# Patient Record
Sex: FEMALE | Race: WHITE | NOT HISPANIC OR LATINO | Employment: FULL TIME | URBAN - METROPOLITAN AREA
[De-identification: names, ages, dates, MRNs, and addresses within clinical notes are randomized per-mention and may not be internally consistent; named-entity substitution may affect disease eponyms.]

---

## 2019-03-20 ENCOUNTER — OFFICE VISIT (OUTPATIENT)
Dept: ENDOCRINOLOGY | Age: 29
End: 2019-03-20

## 2019-03-20 VITALS
DIASTOLIC BLOOD PRESSURE: 88 MMHG | HEART RATE: 97 BPM | HEIGHT: 68 IN | SYSTOLIC BLOOD PRESSURE: 120 MMHG | WEIGHT: 227.4 LBS | BODY MASS INDEX: 34.46 KG/M2

## 2019-03-20 DIAGNOSIS — R53.82 CHRONIC FATIGUE: ICD-10-CM

## 2019-03-20 DIAGNOSIS — R63.5 ABNORMAL WEIGHT GAIN: ICD-10-CM

## 2019-03-20 DIAGNOSIS — E04.2 NONTOXIC MULTINODULAR GOITER: Primary | ICD-10-CM

## 2019-03-20 PROCEDURE — 99205 OFFICE O/P NEW HI 60 MIN: CPT | Performed by: INTERNAL MEDICINE

## 2019-03-20 RX ORDER — DROSPIRENONE AND ETHINYL ESTRADIOL 0.03MG-3MG
1 KIT ORAL DAILY
COMMUNITY
End: 2022-06-09

## 2019-03-20 RX ORDER — BUPROPION HYDROCHLORIDE 300 MG/1
300 TABLET ORAL DAILY
COMMUNITY
End: 2020-10-23 | Stop reason: HOSPADM

## 2019-03-20 RX ORDER — FLUTICASONE PROPIONATE 50 MCG
SPRAY, SUSPENSION (ML) NASAL AS NEEDED
Refills: 3 | COMMUNITY
Start: 2019-02-05

## 2019-03-20 NOTE — PROGRESS NOTES
28 y.o.  Patient Care Team:  Provider, No Known as PCP - General    Chief complaint     NEW PATIENT CONSULT, REFERRED BY JENNIFER NOGUEIRA FOR THYROID NODULES.    HPI   Patient is a 28-year-old white female with a history of multinodular goiter came for new patient consultation    Patient reported that approximately 6 months ago she was noted to have enlarged thyroid on routine examination and underwent thyroid ultrasound  Subsequently she had a repeat ultrasound in January 2019 which revealed that 1 of the nodules has grown larger hence the consultation    Patient denies any difficulty swallowing but she thinks in the [employer]arsan[employer] and occasionally finds it difficult to take some high notes  She also reports sinus drainage    Patient reports symptoms of easy fatigability slight tremor abnormal weight gain for the past several months.  She also reports heat and cold intolerance  She is currently on birth control medication    Abnormal weight gain  Patient gained nearly 50 pounds while she was taking Cymbalta  She apparently stopped Cymbalta and is currently on Wellbutrin and lost 10 pounds so far  Fatigue  Patient reports significant fatigue for the past several months  She is a   She sleeps well  Her energy levels have improved since he started Wellbutrin  Patient has strong family history of celiac disease  Her dad had celiac disease  Patient's mother had Graves' disease and underwent radioiodine ablation  Patient was positive for celiac antibodies in the past and has started adhering to a gluten-free diet and feels much better.    Patient denied any prior history of exposure to head and neck radiation.  She also denied any family history of thyroid cancer        Interval History      The following portions of the patient's history were reviewed and updated as appropriate: allergies, current medications, past family history, past medical history, past social history, past surgical history and  "problem list.    Past Medical History:   Diagnosis Date   • Exercise-induced asthma    • Kidney stones        Family History   Problem Relation Age of Onset   • Hyperthyroidism Mother    • Hypertension Mother    • Arthritis Father    • Hypertension Father        Social History     Socioeconomic History   • Marital status: Unknown     Spouse name: Not on file   • Number of children: Not on file   • Years of education: Not on file   • Highest education level: Not on file       Allergies   Allergen Reactions   • Morphine Rash         Current Outpatient Medications:   •  buPROPion XL (WELLBUTRIN XL) 300 MG 24 hr tablet, Take 300 mg by mouth Daily., Disp: , Rfl:   •  drospirenone-ethinyl estradiol (OCELLA) 3-0.03 MG per tablet, Take 1 tablet by mouth Daily., Disp: , Rfl:   •  fluticasone (FLONASE) 50 MCG/ACT nasal spray, , Disp: , Rfl: 3           Review of Systems   Constitutional: Positive for fatigue. Negative for chills and fever.   HENT: Positive for trouble swallowing and voice change. Negative for sore throat.    Eyes: Negative for pain and redness.   Respiratory: Positive for shortness of breath. Negative for wheezing.    Cardiovascular: Negative for chest pain and palpitations.   Gastrointestinal: Positive for abdominal pain, constipation, diarrhea and nausea. Negative for vomiting.   Endocrine: Positive for cold intolerance and heat intolerance.   Genitourinary: Negative for frequency.   Musculoskeletal: Positive for joint swelling.   Skin: Negative for rash and wound.   Neurological: Negative for tremors, light-headedness and headaches.   Hematological: Bruises/bleeds easily.   Psychiatric/Behavioral: Negative for confusion and sleep disturbance. The patient is nervous/anxious.    All other systems reviewed and are negative.        Objective:  /88   Pulse 97   Ht 172.7 cm (68\")   Wt 103 kg (227 lb 6.4 oz)   BMI 34.58 kg/m²     Physical Exam   Constitutional: She is oriented to person, place, and " time. She appears well-developed and well-nourished.   Obese   HENT:   Head: Normocephalic and atraumatic.   Eyes: EOM are normal. Pupils are equal, round, and reactive to light.   Neck: Normal range of motion. Neck supple. Thyromegaly (Nontender) present.   Cardiovascular: Normal rate, regular rhythm, normal heart sounds and intact distal pulses.   Pulmonary/Chest: Effort normal and breath sounds normal.   Abdominal: Soft. Bowel sounds are normal.   Musculoskeletal: Normal range of motion.   Neurological: She is alert and oriented to person, place, and time.   Skin: Skin is warm and dry.   Psychiatric: She has a normal mood and affect. Her behavior is normal.   Nursing note and vitals reviewed.        Results Review:    I reviewed the patient's new clinical results.  No results found for any previous visit.       No images are attached to the encounter.    Giuseppe was seen today for thyroid problem.    Diagnoses and all orders for this visit:    Nontoxic multinodular goiter  -     T3  -     T4, Free  -     TSH  -     Thyroid Peroxidase Antibody  -     Thyroid Stimulating Immunoglobulin  -     US Thyroid; Future    Chronic fatigue  -     T3  -     T4, Free  -     TSH  -     Thyroid Peroxidase Antibody  -     Thyroid Stimulating Immunoglobulin  -     US Thyroid; Future    Abnormal weight gain    Discussed thyroid function and structure in detail with the patient    I discussed American thyroid Association guidelines for evaluation of thyroid nodules  Thyroid imaging from the CD could not be viewed  Thyroid ultrasound report from high-field MRI reviewed  Patient appears to have multiple nodules  She has a 1.2 cm nodule in the left lobe which has some calcification  This ultrasound was from January 2019    Patient denies any difficulty swallowing or change in voice however she reports the frequent throat clearing and is not able to sing in her [employer] comfortably    Patient has strong family history of celiac disease  her dad had celiac disease  Patient's mother had Graves' disease and had radioiodine ablation    Patient's total T4 was elevated a few months ago but was also on birth control medication at that time    I advised the patient to get lab testing done today to define the thyroid function  I advised her to get a thyroid ultrasound and [facility] in June 2019 before her appointment  Patient will return to follow-up in 3 months    The total time spent  was more than 60 min of which greater than 35 min (greater than 50% of the total time) was spent face to face on counseling the patient on recommended evaluation and treatment options, instructions for management/treatment and /or follow up and importance of compliance with chosen management or treatment options

## 2019-03-22 LAB
T3 SERPL-MCNC: 274 NG/DL (ref 80–200)
T4 FREE SERPL-MCNC: 1.13 NG/DL (ref 0.93–1.7)
THYROPEROXIDASE AB SERPL-ACNC: 15 IU/ML (ref 0–34)
TSH SERPL DL<=0.005 MIU/L-ACNC: 0.62 MIU/ML (ref 0.27–4.2)
TSI ACT/NOR SER: <0.1 IU/L (ref 0–0.55)

## 2019-04-22 ENCOUNTER — APPOINTMENT (OUTPATIENT)
Dept: ULTRASOUND IMAGING | Facility: HOSPITAL | Age: 29
End: 2019-04-22

## 2019-05-28 ENCOUNTER — RESULTS ENCOUNTER (OUTPATIENT)
Dept: ENDOCRINOLOGY | Age: 29
End: 2019-05-28

## 2019-05-28 DIAGNOSIS — E04.2 NONTOXIC MULTINODULAR GOITER: ICD-10-CM

## 2019-05-28 DIAGNOSIS — R53.82 CHRONIC FATIGUE: ICD-10-CM

## 2019-05-28 DIAGNOSIS — E04.2 NONTOXIC MULTINODULAR GOITER: Primary | ICD-10-CM

## 2020-10-12 NOTE — PROGRESS NOTES
29 y.o.    Patient Care Team:  Kirstie Maldonado APRN as PCP - General (Emergency Medicine)    Chief Complaint:    FOLLOW UP/ THYROID NODULES  FORMER [facility]ERV[facility] WING  Subjective     HPI  29-year-old white female is here for the follow-up of thyroid nodules and obesity.  Patient used to see Dr. Ozuna in the past.    Today in clinic patient reports that her energy levels are low, she has been napping daily.  She has been in the medical weight loss program and also on phentermine through a weight loss clinic but still has been having difficulty in losing weight.  Does have family history of thyroid disease, maternal side of hypo and hyperthyroidism.    mother has history of thyroid cancer.    She does report of having a lumpy feeling in her neck, occasional feeling of difficulty in swallowing.  Last thyroid ultrasound was in 2019, she had multiple thyroid nodules with a dominant nodule of 1.2 cm in size.    Last menstrual cycle is from 9/30/2020.        Reviewed primary care physician's/consulting physician documentation and lab results       Interval History      The following portions of the patient's history were reviewed and updated as appropriate: allergies, current medications, past family history, past medical history, past social history, past surgical history and problem list.    Past Medical History:   Diagnosis Date   • Exercise-induced asthma    • Kidney stones      Family History   Problem Relation Age of Onset   • Hyperthyroidism Mother    • Hypertension Mother    • Arthritis Father    • Hypertension Father      Social History     Socioeconomic History   • Marital status: Single     Spouse name: Not on file   • Number of children: Not on file   • Years of education: Not on file   • Highest education level: Not on file   Tobacco Use   • Smoking status: Never Smoker   • Smokeless tobacco: Never Used     Allergies   Allergen Reactions   • Morphine Rash       Current Outpatient Medications:  "  •  drospirenone-ethinyl estradiol (OCELLA) 3-0.03 MG per tablet, Take 1 tablet by mouth Daily., Disp: , Rfl:   •  fluticasone (FLONASE) 50 MCG/ACT nasal spray, , Disp: , Rfl: 3  •  desvenlafaxine (PRISTIQ) 100 MG 24 hr tablet, Take 100 mg by mouth Daily., Disp: , Rfl:   •  phentermine (ADIPEX-P) 37.5 MG tablet, Take 37.5 mg by mouth Daily., Disp: , Rfl:   •  Zenatane 40 MG capsule, Take 40 mg by mouth Daily., Disp: , Rfl:         Review of Systems   Constitutional: Negative for appetite change, fatigue and fever.   HENT: Negative for trouble swallowing.    Eyes: Negative for visual disturbance.   Respiratory: Negative for shortness of breath.    Cardiovascular: Negative for palpitations and leg swelling.   Gastrointestinal: Negative for abdominal pain and vomiting.   Endocrine: Negative for polydipsia and polyuria.   Musculoskeletal: Negative for joint swelling and neck pain.   Skin: Negative for rash.   Neurological: Negative for weakness and numbness.   Psychiatric/Behavioral: Negative for behavioral problems.     I have reviewed and confirmed the accuracy of the ROS as documented by the MA/LPN/RN Jeanette Lui MD          Objective       Vitals:    10/23/20 0846   BP: 146/82   Pulse: (!) 124   Resp: 16   SpO2: 99%   Weight: 99.8 kg (220 lb)   Height: 172.7 cm (68\")     Body mass index is 33.45 kg/m².      Physical Exam  Vitals signs reviewed.   Constitutional:       Appearance: Normal appearance. She is obese. She is not diaphoretic.   HENT:      Head: Normocephalic and atraumatic.   Eyes:      General: No scleral icterus.     Conjunctiva/sclera: Conjunctivae normal.   Neck:      Musculoskeletal: Normal range of motion and neck supple.      Thyroid: No thyromegaly.      Comments: Thyromegaly  Cardiovascular:      Rate and Rhythm: Normal rate.   Pulmonary:      Effort: No respiratory distress.   Abdominal:      General: There is no distension.      Palpations: Abdomen is soft.   Musculoskeletal:         " General: No tenderness or deformity.   Skin:     General: Skin is warm and dry.   Neurological:      Mental Status: She is alert and oriented to person, place, and time. Mental status is at baseline.      Gait: Gait normal.   Psychiatric:         Mood and Affect: Mood normal.         Behavior: Behavior normal.         Results Review:     I reviewed the patient's new clinical results and mentioned them above in HPI and in plan as well.    Medical records reviewed  Summary:Done      Office Visit on 03/20/2019   Component Date Value Ref Range Status   • T3, Total 03/20/2019 274.0* 80.0 - 200.0 ng/dl Final   • Free T4 03/20/2019 1.13  0.93 - 1.70 ng/dL Final   • TSH 03/20/2019 0.624  0.270 - 4.200 mIU/mL Final   • Thyroid Peroxidase Antibody 03/20/2019 15  0 - 34 IU/mL Final   • Thyroid Stimulating Immunoglobulin 03/20/2019 <0.10  0.00 - 0.55 IU/L Final     No results found for: HGBA1C  Lab Results   Component Value Date    CREATININE 0.5 (L) 02/12/2020     Imaging Results (Most Recent)     None                Assessment and Plan:    Diagnoses and all orders for this visit:    1. Nontoxic multinodular goiter (Primary)  -     US Thyroid; Future  -     TSH  -     T4, Free  -     T3, Free  -     Thyroid Peroxidase Antibody  -     TSH; Future  -     T4, Free; Future  -     T3, Free; Future  -     Vitamin B12 & Folate; Future  -     Vitamin D 25 Hydroxy; Future    2. Chronic fatigue  -     US Thyroid; Future  -     TSH  -     T4, Free  -     T3, Free  -     Thyroid Peroxidase Antibody  -     TSH; Future  -     T4, Free; Future  -     T3, Free; Future  -     Vitamin B12 & Folate; Future  -     Vitamin D 25 Hydroxy; Future    3. Abnormal weight gain  -     US Thyroid; Future  -     TSH  -     T4, Free  -     T3, Free  -     Thyroid Peroxidase Antibody  -     TSH; Future  -     T4, Free; Future  -     T3, Free; Future  -     Vitamin B12 & Folate; Future  -     Vitamin D 25 Hydroxy; Future      Multiple thyroid nodules  Reviewed  "the prior thyroid ultrasound from an outside hospital.  Proceed with another ultrasound for the year 2020 and also based on her her bowel symptoms.    Chronic fatigue  Check thyroid panel.    Obesity - worse   On phentermine prescribed by the weight loss clinic.  Gave information in  AVS on calorie counting and exercise regimen to help with the weight loss.      Reviewed Lab results with the patient.               Jeanette Lui MD  10/23/20    EMR Dragon / transcription disclaimer:     \"Dictated utilizing Dragon dictation\".      "

## 2020-10-23 ENCOUNTER — OFFICE VISIT (OUTPATIENT)
Dept: ENDOCRINOLOGY | Age: 30
End: 2020-10-23

## 2020-10-23 VITALS
OXYGEN SATURATION: 99 % | HEIGHT: 68 IN | RESPIRATION RATE: 16 BRPM | WEIGHT: 220 LBS | SYSTOLIC BLOOD PRESSURE: 146 MMHG | HEART RATE: 124 BPM | BODY MASS INDEX: 33.34 KG/M2 | DIASTOLIC BLOOD PRESSURE: 82 MMHG

## 2020-10-23 DIAGNOSIS — R63.5 ABNORMAL WEIGHT GAIN: ICD-10-CM

## 2020-10-23 DIAGNOSIS — R53.82 CHRONIC FATIGUE: ICD-10-CM

## 2020-10-23 DIAGNOSIS — E04.2 NONTOXIC MULTINODULAR GOITER: Primary | ICD-10-CM

## 2020-10-23 PROCEDURE — 99214 OFFICE O/P EST MOD 30 MIN: CPT | Performed by: INTERNAL MEDICINE

## 2020-10-23 RX ORDER — PHENTERMINE HYDROCHLORIDE 37.5 MG/1
37.5 TABLET ORAL DAILY
COMMUNITY
Start: 2020-08-11 | End: 2022-06-09

## 2020-10-23 RX ORDER — DESVENLAFAXINE 100 MG/1
100 TABLET, EXTENDED RELEASE ORAL DAILY
COMMUNITY
Start: 2020-10-12

## 2020-10-23 RX ORDER — ISOTRETINOIN 40 MG/1
40 CAPSULE, GELATIN COATED ORAL DAILY
COMMUNITY
Start: 2020-10-05 | End: 2022-06-09

## 2020-10-24 LAB
T3FREE SERPL-MCNC: 4.2 PG/ML (ref 2–4.4)
T4 FREE SERPL-MCNC: 1.29 NG/DL (ref 0.93–1.7)
THYROPEROXIDASE AB SERPL-ACNC: <9 IU/ML (ref 0–34)
TSH SERPL DL<=0.005 MIU/L-ACNC: 0.71 UIU/ML (ref 0.27–4.2)

## 2020-10-28 ENCOUNTER — TELEPHONE (OUTPATIENT)
Dept: ENDOCRINOLOGY | Age: 30
End: 2020-10-28

## 2020-11-05 ENCOUNTER — TELEPHONE (OUTPATIENT)
Dept: ENDOCRINOLOGY | Age: 30
End: 2020-11-05

## 2020-11-05 NOTE — TELEPHONE ENCOUNTER
----- Message from Nuvia Flores sent at 11/4/2020  2:41 PM EST -----  Yes I just went in and authorized it and sent it over as urgent. I will call them so they can call the patient to schedule.  ----- Message -----  From: Nessa Chowdary MA  Sent: 11/4/2020   9:30 AM EST  To: Nuvia Flores      ----- Message -----  From: Shannan Buitrago  Sent: 11/4/2020   9:07 AM EST  To: Nessa Chowdary MA    Pt was seen on 10/23 and was told she needed to have another thyroid ultra sound before the end of this year and has not heard anything. Its been two week and pt is wanting to get this scheduled asap. Can we get this scheduled and give pt a call? Pt call back [phone].

## 2020-11-06 ENCOUNTER — APPOINTMENT (OUTPATIENT)
Dept: OTHER | Facility: HOSPITAL | Age: 30
End: 2020-11-06

## 2020-11-06 ENCOUNTER — HOSPITAL ENCOUNTER (OUTPATIENT)
Dept: ULTRASOUND IMAGING | Facility: HOSPITAL | Age: 30
Discharge: HOME | End: 2020-11-06

## 2020-11-06 DIAGNOSIS — E04.2 NONTOXIC MULTINODULAR GOITER: ICD-10-CM

## 2020-11-06 DIAGNOSIS — Z09 FOLLOW UP: ICD-10-CM

## 2020-11-06 DIAGNOSIS — R63.5 ABNORMAL WEIGHT GAIN: ICD-10-CM

## 2020-11-06 DIAGNOSIS — R53.82 CHRONIC FATIGUE: ICD-10-CM

## 2020-11-06 PROCEDURE — 76536 US EXAM OF HEAD AND NECK: CPT

## 2020-12-08 ENCOUNTER — TELEPHONE (OUTPATIENT)
Dept: ENDOCRINOLOGY | Age: 30
End: 2020-12-08

## 2020-12-08 NOTE — TELEPHONE ENCOUNTER
Patient called in stating she received a letter about having a ultrasound on thyroid for goiter and nogial   She needs to have it scheduled

## 2021-04-07 ENCOUNTER — LAB (OUTPATIENT)
Dept: ENDOCRINOLOGY | Age: 31
End: 2021-04-07

## 2021-04-07 DIAGNOSIS — E04.2 NONTOXIC MULTINODULAR GOITER: ICD-10-CM

## 2021-04-07 DIAGNOSIS — R63.5 ABNORMAL WEIGHT GAIN: ICD-10-CM

## 2021-04-07 DIAGNOSIS — E55.9 VITAMIN D DEFICIENCY: ICD-10-CM

## 2021-04-07 DIAGNOSIS — R53.82 CHRONIC FATIGUE: ICD-10-CM

## 2021-04-07 DIAGNOSIS — E04.2 NONTOXIC MULTINODULAR GOITER: Primary | ICD-10-CM

## 2021-04-08 LAB
25(OH)D3+25(OH)D2 SERPL-MCNC: 63.4 NG/ML (ref 30–100)
ALBUMIN SERPL-MCNC: 4.1 G/DL (ref 3.5–5.2)
ALBUMIN/GLOB SERPL: 1.4 G/DL
ALP SERPL-CCNC: 87 U/L (ref 39–117)
ALT SERPL-CCNC: 14 U/L (ref 1–33)
AST SERPL-CCNC: 14 U/L (ref 1–32)
BILIRUB SERPL-MCNC: <0.2 MG/DL (ref 0–1.2)
BUN SERPL-MCNC: 12 MG/DL (ref 6–20)
BUN/CREAT SERPL: 21.1 (ref 7–25)
CALCIUM SERPL-MCNC: 9.7 MG/DL (ref 8.6–10.5)
CHLORIDE SERPL-SCNC: 105 MMOL/L (ref 98–107)
CHOLEST SERPL-MCNC: 146 MG/DL (ref 0–200)
CO2 SERPL-SCNC: 24.6 MMOL/L (ref 22–29)
CREAT SERPL-MCNC: 0.57 MG/DL (ref 0.57–1)
FOLATE SERPL-MCNC: 2.81 NG/ML (ref 4.78–24.2)
GLOBULIN SER CALC-MCNC: 2.9 GM/DL
GLUCOSE SERPL-MCNC: 86 MG/DL (ref 65–99)
HDLC SERPL-MCNC: 43 MG/DL (ref 40–60)
IMP & REVIEW OF LAB RESULTS: NORMAL
LDLC SERPL CALC-MCNC: 58 MG/DL (ref 0–100)
POTASSIUM SERPL-SCNC: 4.4 MMOL/L (ref 3.5–5.2)
PROT SERPL-MCNC: 7 G/DL (ref 6–8.5)
SODIUM SERPL-SCNC: 139 MMOL/L (ref 136–145)
T3FREE SERPL-MCNC: 3.4 PG/ML (ref 2–4.4)
T4 FREE SERPL-MCNC: 1.12 NG/DL (ref 0.93–1.7)
TRIGL SERPL-MCNC: 290 MG/DL (ref 0–150)
TSH SERPL DL<=0.005 MIU/L-ACNC: 0.43 UIU/ML (ref 0.27–4.2)
VIT B12 SERPL-MCNC: 1104 PG/ML (ref 211–946)
VLDLC SERPL CALC-MCNC: 45 MG/DL (ref 5–40)

## 2021-04-23 ENCOUNTER — OFFICE VISIT (OUTPATIENT)
Dept: ENDOCRINOLOGY | Age: 31
End: 2021-04-23

## 2021-04-23 VITALS
RESPIRATION RATE: 15 BRPM | SYSTOLIC BLOOD PRESSURE: 122 MMHG | WEIGHT: 209 LBS | BODY MASS INDEX: 31.67 KG/M2 | HEIGHT: 68 IN | DIASTOLIC BLOOD PRESSURE: 82 MMHG | OXYGEN SATURATION: 99 % | HEART RATE: 82 BPM

## 2021-04-23 DIAGNOSIS — R63.5 ABNORMAL WEIGHT GAIN: ICD-10-CM

## 2021-04-23 DIAGNOSIS — E04.2 NONTOXIC MULTINODULAR GOITER: Primary | ICD-10-CM

## 2021-04-23 PROCEDURE — 99214 OFFICE O/P EST MOD 30 MIN: CPT | Performed by: INTERNAL MEDICINE

## 2021-04-23 NOTE — PROGRESS NOTES
"Chief Complaint  Thyroid Problem    Subjective            History of Present Illness  Caterina Palacios,30 y.o. is here as a follow-up for the evaluation of thyroid nodules and morbid obesity.      Today in clinic patient reports that her energy levels are still low, but she has been following weight watchers and as a result has lost about 10 to 15 pounds of weight since her last visit.  She has seen someone at weight loss clinic and has been on phentermine but according to the patient she does have the prescription but she is not using the medication.  Does have family history of thyroid cancer in her mother.    Thyroid ultrasound from November 2020 showed dominant nodule of approximately 1.1 cm in size.  Does report a lumpy feeling in her neck.  Never had the biopsy.    Reviewed primary care physician's/consulting physician documentation and lab results     I have reviewed the patient's allergies, medicines, past medical hx, family hx and social hx in detail.    Objective   Vital Signs:   /82 (BP Location: Right arm, Patient Position: Sitting, Cuff Size: Large Adult)   Pulse 82   Resp 15   Ht 172.7 cm (67.99\")   Wt 94.8 kg (209 lb)   SpO2 99%   BMI 31.79 kg/m²     Physical Exam   General appearance - no distress  Eyes- anicteric sclera  Ear nose and throat-external ears and nose normal.    Respiratory-normal chest on inspection.  No respiratory distress noted.  Skin-no rashes.  Neuro-alert and oriented x3          Result Review :   The following data was reviewed by: Jeanette Lui MD on 04/23/2021:  Lab on 04/07/2021   Component Date Value Ref Range Status   • T3, Free 04/07/2021 3.4  2.0 - 4.4 pg/mL Final   • Free T4 04/07/2021 1.12  0.93 - 1.70 ng/dL Final    Results may be falsely increased if patient taking Biotin.   • TSH 04/07/2021 0.435  0.270 - 4.200 uIU/mL Final   • Total Cholesterol 04/07/2021 146  0 - 200 mg/dL Final    Comment: Cholesterol Reference Ranges  (U.S. Department of Health and " Human Services ATP III  Classifications)  Desirable          <200 mg/dL  Borderline High    200-239 mg/dL  High Risk          >240 mg/dL  Triglyceride Reference Ranges  (U.S. Department of Health and Human Services ATP III  Classifications)  Normal           <150 mg/dL  Borderline High  150-199 mg/dL  High             200-499 mg/dL  Very High        >500 mg/dL  HDL Reference Ranges  (U.S. Department of Health and Human Services ATP III  Classifcations)  Low     <40 mg/dl (major risk factor for CHD)  High    >60 mg/dl ('negative' risk factor for CHD)  LDL Reference Ranges  (U.S. Department of Health and Human Services ATP III  Classifcations)  Optimal          <100 mg/dL  Near Optimal     100-129 mg/dL  Borderline High  130-159 mg/dL  High             160-189 mg/dL  Very High        >189 mg/dL     • Triglycerides 04/07/2021 290* 0 - 150 mg/dL Final   • HDL Cholesterol 04/07/2021 43  40 - 60 mg/dL Final   • VLDL Cholesterol Marcio 04/07/2021 45* 5 - 40 mg/dL Final   • LDL Chol Calc ([facility]) 04/07/2021 58  0 - 100 mg/dL Final   • 25 Hydroxy, Vitamin D 04/07/2021 63.4  30.0 - 100.0 ng/ml Final    Comment: Results may be falsely increased if patient taking Biotin.  Reference Range for Total Vitamin D 25(OH)  Deficiency <20.0 ng/mL  Insufficiency 21-29 ng/mL  Sufficiency  ng/mL  Toxicity >100 ng/ml     • Glucose 04/07/2021 86  65 - 99 mg/dL Final   • BUN 04/07/2021 12  6 - 20 mg/dL Final   • Creatinine 04/07/2021 0.57  0.57 - 1.00 mg/dL Final   • eGFR Non African Am 04/07/2021 126  >60 mL/min/1.73 Final    Comment: GFR Normal >60  Chronic Kidney Disease <60  Kidney Failure <15     • eGFR  Am 04/07/2021 >150  >60 mL/min/1.73 Final   • BUN/Creatinine Ratio 04/07/2021 21.1  7.0 - 25.0 Final   • Sodium 04/07/2021 139  136 - 145 mmol/L Final   • Potassium 04/07/2021 4.4  3.5 - 5.2 mmol/L Final   • Chloride 04/07/2021 105  98 - 107 mmol/L Final   • Total CO2 04/07/2021 24.6  22.0 - 29.0 mmol/L Final   • Calcium  04/07/2021 9.7  8.6 - 10.5 mg/dL Final   • Total Protein 04/07/2021 7.0  6.0 - 8.5 g/dL Final   • Albumin 04/07/2021 4.10  3.50 - 5.20 g/dL Final   • Globulin 04/07/2021 2.9  gm/dL Final   • A/G Ratio 04/07/2021 1.4  g/dL Final   • Total Bilirubin 04/07/2021 <0.2  0.0 - 1.2 mg/dL Final   • Alkaline Phosphatase 04/07/2021 87  39 - 117 U/L Final   • AST (SGOT) 04/07/2021 14  1 - 32 U/L Final   • ALT (SGPT) 04/07/2021 14  1 - 33 U/L Final   • Vitamin B-12 04/07/2021 1,104* 211 - 946 pg/mL Final    Results may be falsely increased if patient taking Biotin.   • Folate 04/07/2021 2.81* 4.78 - 24.20 ng/mL Final    Results may be falsely increased if patient taking Biotin.   • Interpretation 04/07/2021 Note   Final    Supplemental report is available.     Data reviewed: Primary CARE documentation        I reviewed the patient's new clinical results and mentioned them above in HPI and in plan as well.          Assessment and Plan    Problem List Items Addressed This Visit        Other    Nontoxic multinodular goiter - Primary    Relevant Orders    Basic Metabolic Panel    Lipid Panel    TSH    T4, Free    US Guided Thyroid Biopsy    Abnormal weight gain    Relevant Orders    Basic Metabolic Panel    Lipid Panel    TSH    T4, Free    US Guided Thyroid Biopsy        Thyroid nodule  Discussed with the patient the procedure of the biopsy.  Put in the orders for interventional radiology.    Reviewed the thyroid ultrasound from November 2020.    Obesity  Discussed about the behavioral changes-calorie restrictions and exercise regimen to help with the weight loss.    Will defer the phentermine prescription to the primary care or the weight loss clinic.    Follow Up   No follow-ups on file.    Refills/Meds sent to pharmacy    Interpreted the blood work-up/imaging results performed by the primary care/consulting physician -    Patient was given instructions and counseling regarding her condition or for health maintenance advice.  Please see specific information pulled into the AVS if appropriate.

## 2021-05-02 RX ORDER — CETIRIZINE HYDROCHLORIDE 10 MG/1
10 TABLET ORAL DAILY
COMMUNITY
End: 2022-06-09

## 2021-05-07 ENCOUNTER — HOSPITAL ENCOUNTER (OUTPATIENT)
Dept: ULTRASOUND IMAGING | Facility: HOSPITAL | Age: 31
Discharge: HOME | End: 2021-05-07
Admitting: INTERNAL MEDICINE

## 2021-05-07 VITALS
HEIGHT: 68 IN | BODY MASS INDEX: 31.37 KG/M2 | HEART RATE: 80 BPM | DIASTOLIC BLOOD PRESSURE: 93 MMHG | SYSTOLIC BLOOD PRESSURE: 135 MMHG | OXYGEN SATURATION: 100 % | RESPIRATION RATE: 16 BRPM | WEIGHT: 207 LBS | TEMPERATURE: 97.3 F

## 2021-05-07 DIAGNOSIS — R63.5 ABNORMAL WEIGHT GAIN: ICD-10-CM

## 2021-05-07 DIAGNOSIS — E04.2 NONTOXIC MULTINODULAR GOITER: ICD-10-CM

## 2021-05-07 PROCEDURE — 88305 TISSUE EXAM BY PATHOLOGIST: CPT | Performed by: INTERNAL MEDICINE

## 2021-05-07 PROCEDURE — 25010000003 LIDOCAINE 1 % SOLUTION: Performed by: RADIOLOGY

## 2021-05-07 PROCEDURE — 88173 CYTOPATH EVAL FNA REPORT: CPT | Performed by: INTERNAL MEDICINE

## 2021-05-07 RX ORDER — ALPRAZOLAM 0.5 MG/1
0.5 TABLET ORAL ONCE
Status: COMPLETED | OUTPATIENT
Start: 2021-05-07 | End: 2021-05-07

## 2021-05-07 RX ORDER — LIDOCAINE HYDROCHLORIDE 10 MG/ML
10 INJECTION, SOLUTION INFILTRATION; PERINEURAL ONCE
Status: COMPLETED | OUTPATIENT
Start: 2021-05-07 | End: 2021-05-07

## 2021-05-07 RX ADMIN — LIDOCAINE HYDROCHLORIDE 6 ML: 10 INJECTION, SOLUTION INFILTRATION; PERINEURAL at 11:18

## 2021-05-07 RX ADMIN — ALPRAZOLAM 0.5 MG: 0.5 TABLET ORAL at 09:17

## 2021-05-08 ENCOUNTER — TELEPHONE (OUTPATIENT)
Dept: RADIOLOGY | Facility: HOSPITAL | Age: 31
End: 2021-05-08

## 2021-05-10 LAB
CYTO UR: NORMAL
CYTOLOGY INITIAL INTERPRETATION: NORMAL
LAB AP CASE REPORT: NORMAL
LAB AP NON-GYN SPECIMEN ADEQUACY: NORMAL
PATH REPORT.FINAL DX SPEC: NORMAL
PATH REPORT.GROSS SPEC: NORMAL

## 2021-05-10 NOTE — PROGRESS NOTES
Please let the patient know that the biopsy was benign.  Means no cancer cells were found which is great news.

## 2022-06-09 ENCOUNTER — OFFICE VISIT (OUTPATIENT)
Dept: ENDOCRINOLOGY | Age: 32
End: 2022-06-09

## 2022-06-09 VITALS
HEART RATE: 75 BPM | WEIGHT: 237.2 LBS | DIASTOLIC BLOOD PRESSURE: 72 MMHG | SYSTOLIC BLOOD PRESSURE: 118 MMHG | HEIGHT: 68 IN | BODY MASS INDEX: 35.95 KG/M2 | TEMPERATURE: 97.3 F | OXYGEN SATURATION: 98 %

## 2022-06-09 DIAGNOSIS — E04.2 NONTOXIC MULTINODULAR GOITER: Primary | ICD-10-CM

## 2022-06-09 PROCEDURE — 99214 OFFICE O/P EST MOD 30 MIN: CPT | Performed by: INTERNAL MEDICINE

## 2022-06-09 NOTE — PROGRESS NOTES
"Chief Complaint  Goiter    Subjective            History of Present Illness  Caterina Palacios,31 y.o. is here as a follow-up for the evaluation of goiter and thyroid issue along with morbid obesity.    Today in clinic patient reports that she is planning to get pregnant.  Has been trying so for about a year now.  Planning to start Clomid in the next few weeks by the OB/GYN.  During last visit thyroid ultrasound was noted with a dominant nodule of 1.1 cm in size, s/p biopsy, with pathology being benign.  She is not on any thyroid replacement based on the blood work-up from April 2021.      Reviewed primary care physician's/consulting physician documentation and lab results     I have reviewed the patient's allergies, medicines, past medical hx, family hx and social hx in detail.    Objective   Vital Signs:   /72   Pulse 75   Temp 97.3 °F (36.3 °C)   Ht 172.7 cm (68\")   Wt 108 kg (237 lb 3.2 oz)   SpO2 98%   BMI 36.07 kg/m²     Physical Exam   General appearance - no distress  Eyes- anicteric sclera  Ear nose and throat-external ears and nose normal.    Respiratory-normal chest on inspection.  No respiratory distress noted.  Skin-no rashes.  Neuro-alert and oriented x3          Result Review :   The following data was reviewed by: Jeanette Lui MD on 06/09/2022:  Hospital Outpatient Visit on 05/07/2021   Component Date Value Ref Range Status   • Case Report 05/07/2021    Final                    Value:Medical Cytology Report                           Case: [id number]                                 Authorizing Provider:  Jeanette Lui MD        Collected:           05/07/2021 10:15 AM          Ordering Location:     [facility]  Received:            05/07/2021 11:46 AM                                 [place]                                                                           Pathologist:           Telma Pérez MD                                                          Specimen:    " Thyroid, Left                                                                             • Final Diagnosis 05/07/2021    Final                    Value:This result contains rich text formatting which cannot be displayed here.   • Cytology Initial Interpretation 05/07/2021    Final                    Value:This result contains rich text formatting which cannot be displayed here.   • Specimen Adequacy 05/07/2021 FNA immediate cytologic evaluation, satisfactory   Final   • Gross Description 05/07/2021    Final                    Value:This result contains rich text formatting which cannot be displayed here.   • Microscopic Description 05/07/2021    Final                    Value:This result contains rich text formatting which cannot be displayed here.     Data reviewed: OB/GYN and ultrasound report        I reviewed the patient's new clinical results and mentioned them above in HPI and in plan as well.          Assessment and Plan    Problem List Items Addressed This Visit        Other    Nontoxic multinodular goiter - Primary    Relevant Orders    TSH    T3, Free    T4, Free    US Thyroid    Vitamin D 25 Hydroxy    Vitamin B12 & Folate    Basic Metabolic Panel        Goiter  Interpreted the ultrasound report from 2020.  Integrated the pathology report and devised the current treatment plan  Proceed with a thyroid ultrasound.    Planning pregnancy  Check folic acid levels has been very low before    Check B12 and vitamin D levels.    will have the TSH levels below 2.5 to help with the fertility.    Follow Up   No follow-ups on file.    Refills/Meds sent to pharmacy    Interpreted the blood work-up/imaging results performed by the primary care/consulting physician -    Patient was given instructions and counseling regarding her condition or for health maintenance advice. Please see specific information pulled into the AVS if appropriate.

## 2022-06-10 LAB
25(OH)D3+25(OH)D2 SERPL-MCNC: 31.2 NG/ML (ref 30–100)
BUN SERPL-MCNC: 11 MG/DL (ref 6–20)
BUN/CREAT SERPL: 19 (ref 9–23)
CALCIUM SERPL-MCNC: 10.4 MG/DL (ref 8.7–10.2)
CHLORIDE SERPL-SCNC: 100 MMOL/L (ref 96–106)
CO2 SERPL-SCNC: 21 MMOL/L (ref 20–29)
CREAT SERPL-MCNC: 0.59 MG/DL (ref 0.57–1)
EGFRCR SERPLBLD CKD-EPI 2021: 125 ML/MIN/1.73
FOLATE SERPL-MCNC: 13.1 NG/ML
GLUCOSE SERPL-MCNC: 79 MG/DL (ref 65–99)
POTASSIUM SERPL-SCNC: 4.5 MMOL/L (ref 3.5–5.2)
SODIUM SERPL-SCNC: 138 MMOL/L (ref 134–144)
T3FREE SERPL-MCNC: 4.6 PG/ML (ref 2–4.4)
T4 FREE SERPL-MCNC: 1.34 NG/DL (ref 0.82–1.77)
TSH SERPL DL<=0.005 MIU/L-ACNC: 0.65 UIU/ML (ref 0.45–4.5)
VIT B12 SERPL-MCNC: 966 PG/ML (ref 232–1245)

## 2022-06-20 ENCOUNTER — HOSPITAL ENCOUNTER (OUTPATIENT)
Dept: ULTRASOUND IMAGING | Facility: HOSPITAL | Age: 32
Discharge: HOME | End: 2022-06-20
Admitting: INTERNAL MEDICINE

## 2022-06-20 DIAGNOSIS — E04.2 NONTOXIC MULTINODULAR GOITER: ICD-10-CM

## 2022-06-20 PROCEDURE — 76536 US EXAM OF HEAD AND NECK: CPT

## 2022-11-08 LAB
EXTERNAL HEPATITIS B SURFACE ANTIGEN: NEGATIVE
EXTERNAL HEPATITIS C AB: NON REACTIVE
EXTERNAL RUBELLA QUALITATIVE: NORMAL
EXTERNAL SYPHILIS RPR SCREEN: NORMAL
HIV1 P24 AG SERPL QL IA: NORMAL

## 2023-05-14 ENCOUNTER — HOSPITAL ENCOUNTER (OUTPATIENT)
Facility: HOSPITAL | Age: 33
Discharge: HOME | End: 2023-05-15
Attending: STUDENT IN AN ORGANIZED HEALTH CARE EDUCATION/TRAINING PROGRAM | Admitting: OBSTETRICS & GYNECOLOGY
Payer: COMMERCIAL

## 2023-05-14 PROBLEM — Z34.90 CURRENTLY PREGNANT: Status: ACTIVE | Noted: 2023-05-14

## 2023-05-14 LAB
BACTERIA UR QL AUTO: ABNORMAL /HPF
BILIRUB UR QL STRIP: NEGATIVE
CLARITY UR: CLEAR
COLOR UR: YELLOW
GLUCOSE UR STRIP-MCNC: NEGATIVE MG/DL
HGB UR QL STRIP.AUTO: ABNORMAL
HYALINE CASTS UR QL AUTO: ABNORMAL /LPF
KETONES UR QL STRIP: ABNORMAL
LEUKOCYTE ESTERASE UR QL STRIP.AUTO: ABNORMAL
NITRITE UR QL STRIP: NEGATIVE
PH UR STRIP.AUTO: 7 [PH] (ref 5–8)
PROT UR QL STRIP: NEGATIVE
RBC # UR STRIP: ABNORMAL /HPF
REF LAB TEST METHOD: ABNORMAL
SP GR UR STRIP: 1.01 (ref 1–1.03)
SQUAMOUS #/AREA URNS HPF: ABNORMAL /HPF
UROBILINOGEN UR QL STRIP: ABNORMAL
WBC # UR STRIP: ABNORMAL /HPF

## 2023-05-14 PROCEDURE — 81001 URINALYSIS AUTO W/SCOPE: CPT | Performed by: OBSTETRICS & GYNECOLOGY

## 2023-05-14 PROCEDURE — G0378 HOSPITAL OBSERVATION PER HR: HCPCS

## 2023-05-14 PROCEDURE — 25010000002 CEFTRIAXONE PER 250 MG: Performed by: OBSTETRICS & GYNECOLOGY

## 2023-05-14 PROCEDURE — 96360 HYDRATION IV INFUSION INIT: CPT

## 2023-05-14 PROCEDURE — 96361 HYDRATE IV INFUSION ADD-ON: CPT

## 2023-05-14 PROCEDURE — G0463 HOSPITAL OUTPT CLINIC VISIT: HCPCS

## 2023-05-14 RX ORDER — SODIUM CHLORIDE, SODIUM LACTATE, POTASSIUM CHLORIDE, CALCIUM CHLORIDE 600; 310; 30; 20 MG/100ML; MG/100ML; MG/100ML; MG/100ML
125 INJECTION, SOLUTION INTRAVENOUS CONTINUOUS
Status: DISCONTINUED | OUTPATIENT
Start: 2023-05-14 | End: 2023-05-15 | Stop reason: HOSPADM

## 2023-05-14 RX ORDER — CYCLOBENZAPRINE HCL 10 MG
10 TABLET ORAL ONCE
Status: COMPLETED | OUTPATIENT
Start: 2023-05-14 | End: 2023-05-14

## 2023-05-14 RX ORDER — ASPIRIN 81 MG/1
81 TABLET, CHEWABLE ORAL DAILY
COMMUNITY

## 2023-05-14 RX ORDER — SODIUM CHLORIDE 0.9 % (FLUSH) 0.9 %
10 SYRINGE (ML) INJECTION AS NEEDED
Status: DISCONTINUED | OUTPATIENT
Start: 2023-05-14 | End: 2023-05-15 | Stop reason: HOSPADM

## 2023-05-14 RX ORDER — ACETAMINOPHEN 500 MG
1000 TABLET ORAL EVERY 6 HOURS PRN
Status: DISCONTINUED | OUTPATIENT
Start: 2023-05-14 | End: 2023-05-15 | Stop reason: HOSPADM

## 2023-05-14 RX ORDER — FERROUS SULFATE 325(65) MG
325 TABLET ORAL
COMMUNITY

## 2023-05-14 RX ORDER — PRENATAL VIT NO.126/IRON/FOLIC 28MG-0.8MG
1 TABLET ORAL DAILY
COMMUNITY

## 2023-05-14 RX ADMIN — SODIUM CHLORIDE, POTASSIUM CHLORIDE, SODIUM LACTATE AND CALCIUM CHLORIDE 125 ML/HR: 600; 310; 30; 20 INJECTION, SOLUTION INTRAVENOUS at 08:50

## 2023-05-14 RX ADMIN — CYCLOBENZAPRINE 10 MG: 10 TABLET, FILM COATED ORAL at 05:52

## 2023-05-14 RX ADMIN — ACETAMINOPHEN 1000 MG: 500 TABLET, FILM COATED ORAL at 18:45

## 2023-05-14 RX ADMIN — SODIUM CHLORIDE, POTASSIUM CHLORIDE, SODIUM LACTATE AND CALCIUM CHLORIDE 125 ML/HR: 600; 310; 30; 20 INJECTION, SOLUTION INTRAVENOUS at 20:15

## 2023-05-14 RX ADMIN — CEFTRIAXONE 2 G: 2 INJECTION, POWDER, FOR SOLUTION INTRAMUSCULAR; INTRAVENOUS at 06:01

## 2023-05-14 RX ADMIN — SODIUM CHLORIDE, POTASSIUM CHLORIDE, SODIUM LACTATE AND CALCIUM CHLORIDE 1000 ML: 600; 310; 30; 20 INJECTION, SOLUTION INTRAVENOUS at 06:40

## 2023-05-14 NOTE — SIGNIFICANT NOTE
Called Dr. Rice at this time regarding patient. Patient  34 weeks 2 days. Patient came in tonight with complaints of back pain 8 out of 10. Not relieved with tylenol. Patient currently rates her pain 3-4. Patient UA showed 1+ ketones, trace blood, trace leukocytes, trace bacteria, and wbc 6-12. Orders given for 1L LR bolus, 10mg po flexeril, 1gm rocephin IV, and recheck in couple of hours.

## 2023-05-14 NOTE — PLAN OF CARE
Goal Outcome Evaluation:  Plan of Care Reviewed With: patient        Progress: improving  Outcome Evaluation: Patient back pain and cramping decreased.

## 2023-05-14 NOTE — H&P
SHARIFA Conway  Obstetric History and Physical     Chief Complaint: Pt presented to triage reporting contractions.     Subjective     Patient is a 32 y.o. female  currently at 34.2 , who presents with contractions.      Prenatal Information:  See office H&P for full details and labs.      Past OB History:       OB History    Para Term  AB Living   1 0 0 0 0 0   SAB IAB Ectopic Molar Multiple Live Births   0 0 0 0 0 0               Past Medical History: Past Medical History:   Diagnosis Date   • Exercise-induced asthma    • Kidney stones     kidney stones        Past Surgical History Past Surgical History:   Procedure Laterality Date   • US GUIDED FINE NEEDLE ASPIRATION  2021        Family History: Family History   Problem Relation Age of Onset   • Hyperthyroidism Mother    • Hypertension Mother    • Arthritis Father    • Hypertension Father       Social History:  reports that she has never smoked. She has never used smokeless tobacco.   reports no history of alcohol use.   reports no history of drug use.        General ROS: Pertinent items are noted in HPI    Objective      Vitals:     Vitals:    23 0850 23 0900 23 0930 23 1000   BP:  112/64 130/83 143/74   BP Location:       Patient Position:       Pulse:  99 96 102   Resp:       Temp: 98.8 °F (37.1 °C)      TempSrc:       SpO2:  100% 100% 100%   Weight:       Height:           Fetal Heart Rate Assessment:   Category 1    [place]:   q 2     Physical Exam:     General Appearance:    Alert, cooperative, in no acute distress   Abdomen:     Soft, non-tender, no guarding, no rebound tenderness,       EFW < 7#   Pelvic Exam:    Pelvis adequate.    Presentation: Vertex    Cervix: changed from closed to FT/50%    Extremities:   Moves all extremities well, no edema, no cyanosis, no              redness   Skin:   No bleeding, bruising or rash   Neurologic:   No focal neurologic defect          Laboratory Results:   Lab Results (last 48  hours)     Procedure Component Value Units Date/Time    Urinalysis, Microscopic Only - Urine, Clean Catch [[id number]]  (Abnormal) Collected: 23    Specimen: Urine, Clean Catch Updated: 23 0503     RBC, UA 0-2 /HPF      WBC, UA 6-12 /HPF      Bacteria, UA Trace /HPF      Squamous Epithelial Cells, UA 3-6 /HPF      Hyaline Casts, UA 0-2 /LPF      Methodology Automated Microscopy    Urinalysis With Culture If Indicated - Urine, Clean Catch [[id number]]  (Abnormal) Collected: 23    Specimen: Urine, Clean Catch Updated: 23     Color, UA Yellow     Appearance, UA Clear     pH, UA 7.0     Specific Gravity, UA 1.009     Glucose, UA Negative     Ketones, UA 15 mg/dL (1+)     Bilirubin, UA Negative     Blood, UA Trace     Protein, UA Negative     Leuk Esterase, UA Trace     Nitrite, UA Negative     Urobilinogen, UA 0.2 E.U./dL    Narrative:      In absence of clinical symptoms, the presence of pyuria, bacteria, and/or nitrites on the urinalysis result does not correlate with infection.             Assessment & Plan     Principal Problem:    Currently pregnant         Assessment:  1.  Intrauterine pregnancy at 34.2 wks gestation.    2.   contractions  3.  UTI     Plan:  1. Will admit for observation and observe for cervical change. Will continue ivfs and Flexeril   2. UTI treated with Rocephin   3.  Risks, benefits of treatment plan have been discussed.  4.  All questions have been answered.         Shannan Rice MD   2023   10:32 EDT

## 2023-05-15 VITALS
DIASTOLIC BLOOD PRESSURE: 81 MMHG | HEART RATE: 106 BPM | SYSTOLIC BLOOD PRESSURE: 131 MMHG | TEMPERATURE: 97.9 F | BODY MASS INDEX: 37.82 KG/M2 | HEIGHT: 68 IN | OXYGEN SATURATION: 100 % | WEIGHT: 249.56 LBS | RESPIRATION RATE: 18 BRPM

## 2023-05-15 LAB
COLLECT DURATION TIME UR: 12 HRS
PROT 24H UR-MRATE: 111 MG/24HOURS (ref 0–150)
SPECIMEN VOL 24H UR: 1500 ML

## 2023-05-15 PROCEDURE — G0378 HOSPITAL OBSERVATION PER HR: HCPCS

## 2023-05-15 PROCEDURE — 81050 URINALYSIS VOLUME MEASURE: CPT | Performed by: OBSTETRICS & GYNECOLOGY

## 2023-05-15 PROCEDURE — 25010000002 CEFTRIAXONE PER 250 MG: Performed by: OBSTETRICS & GYNECOLOGY

## 2023-05-15 PROCEDURE — 84156 ASSAY OF PROTEIN URINE: CPT | Performed by: OBSTETRICS & GYNECOLOGY

## 2023-05-15 RX ADMIN — CEFTRIAXONE 2 G: 2 INJECTION, POWDER, FOR SOLUTION INTRAMUSCULAR; INTRAVENOUS at 06:32

## 2023-05-15 NOTE — NON STRESS TEST
Obstetrical Non-stress Test Interpretation     Name:  Caterina Palacios  MRN: [id number]    32 y.o. female  at 34w3d    Indication: Antepartum      Baseline: Normal 110/160   Variability:   Moderate/Normal (amplitude 6-25 bpm)   Accelerations: Present   Decelerations: Absent   Contractions:  Present       Impression:  Category 1      Helen Ramirez RN  5/15/2023  08:49 EDT

## 2023-05-15 NOTE — NON STRESS TEST
Obstetrical Non-stress Test Interpretation     Name:  Caterina Palacios  MRN: [id number]    32 y.o. female  at 34w2d    Indication: Antepartum       Baseline: Normal 110-160 bpm  Variability:   Moderate/Normal (amplitude 6-25 bpm)  Accelerations: Present (32 weeks+) 15 x 15 bpm  Decelerations: Absent   Contractions:  Present       Impression:  Category I    Verified with second nurse, Abi Jimenez RN  2023  20:24 EDT     1 or 2

## 2023-05-15 NOTE — SIGNIFICANT NOTE
Case Management Discharge Note      Final Note: (P) home         Selected Continued Care - Discharged on 5/15/2023 Admission date: 5/14/2023 - Discharge disposition: Home or Self Care              Transportation Services  Private: Car    Final Discharge Disposition Code: (P) 01 - home or self-care

## 2023-05-15 NOTE — PLAN OF CARE
Goal Outcome Evaluation:  Plan of Care Reviewed With: patient, spouse           Outcome Evaluation: Dr. Diane notified of lab results, nst reactive. OK to discharge to home. Patient scheduled for prenatal visit tomorrow. Discussed  labor warning s/s, SROM, elevated b/p's, warning s/s discussed as well as when to call MD or come to OB. Strongly advised drinking plenty of water, stay hydrated. Pt verbalized understanding. IV d/rafita and pt left per ambulatory to home.

## 2023-05-16 LAB — EXTERNAL GROUP B STREP ANTIGEN: NORMAL

## 2023-05-16 NOTE — DISCHARGE SUMMARY
Date of Discharge:  2023    Presenting Problem/History of Present Illness:  Active Hospital Problems    Diagnosis  POA   • **Currently pregnant [Z34.90]  Not Applicable      Resolved Hospital Problems   No resolved problems to display.       Discharge Diagnosis:   Gestational hypertension  Rule out  labor    Procedures Performed:           Consults:   Consults     No orders found from 4/15/2023 to 5/15/2023.          Hospital Course:  Patient is a 32 y.o. female  currently at 34w3d, presented with  contractions and concern for PTL. Admitted overnight for observation with no cervical change, 0/0/-3 on discharge.   During observation found to have elevated Bps and she now meets criteria for GHTN due to mild range Bps. Had 24 hour urine with normal protein of 111, so no signs of proteinuria. No symptoms of severe preeclampsia. Patient was counseled on strict return precaution sand s/sx of PTL, and also counseled on s/sx of preE. She has a FU apt with me on Tuesday and will plan for delivery planning at that time due to new dx of GHTN.     Pertinent Test Results:   Lab Results (last 72 hours)     Procedure Component Value Units Date/Time    Protein, Urine, 24 Hour - Urine, Clean Catch [[id number]] Collected: 05/15/23 0840    Specimen: 24 Hour Urine from Urine, Clean Catch Updated: 05/15/23 0917     Protein, 24H Urine 111.0 mg/24hours      24H Urine Volume 1,500 mL      Time (Hours) 12 hrs     Narrative:      This is not a 24 hour specimen. The normal reference range only applies to a 24 HR collection.     Urinalysis, Microscopic Only - Urine, Clean Catch [[id number]]  (Abnormal) Collected: 23 0448    Specimen: Urine, Clean Catch Updated: 23 0503     RBC, UA 0-2 /HPF      WBC, UA 6-12 /HPF      Bacteria, UA Trace /HPF      Squamous Epithelial Cells, UA 3-6 /HPF      Hyaline Casts, UA 0-2 /LPF      Methodology Automated Microscopy    Urinalysis With Culture If Indicated - Urine, Clean  Catch [[id number]]  (Abnormal) Collected: 05/14/23 0448    Specimen: Urine, Clean Catch Updated: 05/14/23 0509     Color, UA Yellow     Appearance, UA Clear     pH, UA 7.0     Specific Gravity, UA 1.009     Glucose, UA Negative     Ketones, UA 15 mg/dL (1+)     Bilirubin, UA Negative     Blood, UA Trace     Protein, UA Negative     Leuk Esterase, UA Trace     Nitrite, UA Negative     Urobilinogen, UA 0.2 E.U./dL    Narrative:      In absence of clinical symptoms, the presence of pyuria, bacteria, and/or nitrites on the urinalysis result does not correlate with infection.        Imaging Results (Last 72 Hours)     ** No results found for the last 72 hours. **          Condition on Discharge:  Good    Vital Signs:  Vitals:    05/15/23 0600 05/15/23 0700 05/15/23 0800 05/15/23 0900   BP: 108/83 116/63 134/87 131/81   BP Location:       Pulse: 94 84 103 106   Resp:  18     Temp:  97.9 °F (36.6 °C)     TempSrc:  Oral     SpO2: 98% 100% 100%    Weight:       Height:           Physical Exam:     General Appearance:    Alert, cooperative, in no acute distress   Lungs:     Non labored respirations    Heart:    Regular rhythm and normal rate.    Breast Exam:    Deferred   Abdomen:     Gravid uterus   Genitalia:    0/0/-3 UNCHANGED EXAM    Extremities:    Fetal Assessment:   Moves all extremities well, no edema, no cyanosis, no             Redness   RNST       Discharge Disposition:  Home    Discharge Medications:     Discharge Medications      ASK your doctor about these medications      Instructions Start Date   aspirin 81 MG chewable tablet   81 mg, Oral, Daily      desvenlafaxine 100 MG 24 hr tablet  Commonly known as: PRISTIQ   100 mg, Oral, Daily      ferrous sulfate 325 (65 FE) MG tablet   325 mg, Oral, Daily With Breakfast      prenatal (CLASSIC) vitamin  tablet  Generic drug: prenatal vitamin   1 tablet, Oral, Daily             Activity at Discharge:     Follow-up Appointments  No future appointments.      Test Results  Pending at Discharge       Nika Diane MD  05/16/23  18:40 EDT

## 2023-05-29 ENCOUNTER — HOSPITAL ENCOUNTER (INPATIENT)
Facility: HOSPITAL | Age: 33
LOS: 5 days | Discharge: HOME | End: 2023-06-03
Attending: STUDENT IN AN ORGANIZED HEALTH CARE EDUCATION/TRAINING PROGRAM | Admitting: STUDENT IN AN ORGANIZED HEALTH CARE EDUCATION/TRAINING PROGRAM
Payer: COMMERCIAL

## 2023-05-29 ENCOUNTER — APPOINTMENT (OUTPATIENT)
Dept: ULTRASOUND IMAGING | Facility: HOSPITAL | Age: 33
End: 2023-05-29
Payer: COMMERCIAL

## 2023-05-29 PROBLEM — Z34.90 PREGNANT AND NOT YET DELIVERED: Status: ACTIVE | Noted: 2023-05-29

## 2023-05-29 LAB
ABO GROUP BLD: NORMAL
ALBUMIN SERPL-MCNC: 3.3 G/DL (ref 3.5–5.2)
ALBUMIN/GLOB SERPL: 1.1 G/DL
ALP SERPL-CCNC: 158 U/L (ref 39–117)
ALT SERPL W P-5'-P-CCNC: 8 U/L (ref 1–33)
ANION GAP SERPL CALCULATED.3IONS-SCNC: 13 MMOL/L (ref 5–15)
AST SERPL-CCNC: 14 U/L (ref 1–32)
BACTERIA UR QL AUTO: ABNORMAL /HPF
BILIRUB SERPL-MCNC: <0.2 MG/DL (ref 0–1.2)
BILIRUB UR QL STRIP: NEGATIVE
BLD GP AB SCN SERPL QL: NEGATIVE
BUN SERPL-MCNC: 5 MG/DL (ref 6–20)
BUN/CREAT SERPL: 9.6 (ref 7–25)
CALCIUM SPEC-SCNC: 9.1 MG/DL (ref 8.6–10.5)
CHLORIDE SERPL-SCNC: 103 MMOL/L (ref 98–107)
CLARITY UR: ABNORMAL
CO2 SERPL-SCNC: 20 MMOL/L (ref 22–29)
COD CRY URNS QL: ABNORMAL /HPF
COLOR UR: YELLOW
CREAT SERPL-MCNC: 0.52 MG/DL (ref 0.57–1)
CREAT UR-MCNC: 299.7 MG/DL
DEPRECATED RDW RBC AUTO: 51.6 FL (ref 37–54)
EGFRCR SERPLBLD CKD-EPI 2021: 128.4 ML/MIN/1.73
ERYTHROCYTE [DISTWIDTH] IN BLOOD BY AUTOMATED COUNT: 17.4 % (ref 12.3–15.4)
GLOBULIN UR ELPH-MCNC: 3 GM/DL
GLUCOSE SERPL-MCNC: 99 MG/DL (ref 65–99)
GLUCOSE UR STRIP-MCNC: NEGATIVE MG/DL
HCT VFR BLD AUTO: 33.1 % (ref 34–46.6)
HGB BLD-MCNC: 10.7 G/DL (ref 12–15.9)
HGB UR QL STRIP.AUTO: NEGATIVE
HYALINE CASTS UR QL AUTO: ABNORMAL /LPF
KETONES UR QL STRIP: ABNORMAL
LDH SERPL-CCNC: 154 U/L (ref 135–214)
LEUKOCYTE ESTERASE UR QL STRIP.AUTO: ABNORMAL
MCH RBC QN AUTO: 27.5 PG (ref 26.6–33)
MCHC RBC AUTO-ENTMCNC: 32.4 G/DL (ref 31.5–35.7)
MCV RBC AUTO: 84.9 FL (ref 79–97)
NITRITE UR QL STRIP: NEGATIVE
PH UR STRIP.AUTO: 5.5 [PH] (ref 5–8)
PLATELET # BLD AUTO: 260 10*3/MM3 (ref 140–450)
PMV BLD AUTO: 9.8 FL (ref 6–12)
POTASSIUM SERPL-SCNC: 4 MMOL/L (ref 3.5–5.2)
PROT ?TM UR-MCNC: 45 MG/DL
PROT SERPL-MCNC: 6.3 G/DL (ref 6–8.5)
PROT UR QL STRIP: ABNORMAL
PROT/CREAT UR: 150.2 MG/G CREA (ref 0–200)
RBC # BLD AUTO: 3.9 10*6/MM3 (ref 3.77–5.28)
RBC # UR STRIP: ABNORMAL /HPF
REF LAB TEST METHOD: ABNORMAL
RH BLD: POSITIVE
SODIUM SERPL-SCNC: 136 MMOL/L (ref 136–145)
SP GR UR STRIP: 1.03 (ref 1–1.03)
SQUAMOUS #/AREA URNS HPF: ABNORMAL /HPF
T&S EXPIRATION DATE: NORMAL
UROBILINOGEN UR QL STRIP: ABNORMAL
WBC # UR STRIP: ABNORMAL /HPF
WBC NRBC COR # BLD: 7.3 10*3/MM3 (ref 3.4–10.8)

## 2023-05-29 PROCEDURE — 85027 COMPLETE CBC AUTOMATED: CPT | Performed by: STUDENT IN AN ORGANIZED HEALTH CARE EDUCATION/TRAINING PROGRAM

## 2023-05-29 PROCEDURE — 86901 BLOOD TYPING SEROLOGIC RH(D): CPT

## 2023-05-29 PROCEDURE — 25010000002 BETAMETHASONE ACET & SOD PHOS PER 4 MG: Performed by: STUDENT IN AN ORGANIZED HEALTH CARE EDUCATION/TRAINING PROGRAM

## 2023-05-29 PROCEDURE — 86850 RBC ANTIBODY SCREEN: CPT | Performed by: STUDENT IN AN ORGANIZED HEALTH CARE EDUCATION/TRAINING PROGRAM

## 2023-05-29 PROCEDURE — 86901 BLOOD TYPING SEROLOGIC RH(D): CPT | Performed by: STUDENT IN AN ORGANIZED HEALTH CARE EDUCATION/TRAINING PROGRAM

## 2023-05-29 PROCEDURE — 3E033VJ INTRODUCTION OF OTHER HORMONE INTO PERIPHERAL VEIN, PERCUTANEOUS APPROACH: ICD-10-PCS | Performed by: STUDENT IN AN ORGANIZED HEALTH CARE EDUCATION/TRAINING PROGRAM

## 2023-05-29 PROCEDURE — 83615 LACTATE (LD) (LDH) ENZYME: CPT | Performed by: STUDENT IN AN ORGANIZED HEALTH CARE EDUCATION/TRAINING PROGRAM

## 2023-05-29 PROCEDURE — 3E0P7VZ INTRODUCTION OF HORMONE INTO FEMALE REPRODUCTIVE, VIA NATURAL OR ARTIFICIAL OPENING: ICD-10-PCS | Performed by: STUDENT IN AN ORGANIZED HEALTH CARE EDUCATION/TRAINING PROGRAM

## 2023-05-29 PROCEDURE — 80053 COMPREHEN METABOLIC PANEL: CPT | Performed by: STUDENT IN AN ORGANIZED HEALTH CARE EDUCATION/TRAINING PROGRAM

## 2023-05-29 PROCEDURE — 82570 ASSAY OF URINE CREATININE: CPT | Performed by: STUDENT IN AN ORGANIZED HEALTH CARE EDUCATION/TRAINING PROGRAM

## 2023-05-29 PROCEDURE — 87086 URINE CULTURE/COLONY COUNT: CPT | Performed by: STUDENT IN AN ORGANIZED HEALTH CARE EDUCATION/TRAINING PROGRAM

## 2023-05-29 PROCEDURE — 86900 BLOOD TYPING SEROLOGIC ABO: CPT

## 2023-05-29 PROCEDURE — 84156 ASSAY OF PROTEIN URINE: CPT | Performed by: STUDENT IN AN ORGANIZED HEALTH CARE EDUCATION/TRAINING PROGRAM

## 2023-05-29 PROCEDURE — 76815 OB US LIMITED FETUS(S): CPT

## 2023-05-29 PROCEDURE — 86900 BLOOD TYPING SEROLOGIC ABO: CPT | Performed by: STUDENT IN AN ORGANIZED HEALTH CARE EDUCATION/TRAINING PROGRAM

## 2023-05-29 PROCEDURE — 10907ZC DRAINAGE OF AMNIOTIC FLUID, THERAPEUTIC FROM PRODUCTS OF CONCEPTION, VIA NATURAL OR ARTIFICIAL OPENING: ICD-10-PCS | Performed by: STUDENT IN AN ORGANIZED HEALTH CARE EDUCATION/TRAINING PROGRAM

## 2023-05-29 PROCEDURE — 81001 URINALYSIS AUTO W/SCOPE: CPT | Performed by: STUDENT IN AN ORGANIZED HEALTH CARE EDUCATION/TRAINING PROGRAM

## 2023-05-29 RX ORDER — SODIUM CHLORIDE 0.9 % (FLUSH) 0.9 %
10 SYRINGE (ML) INJECTION EVERY 12 HOURS SCHEDULED
Status: DISCONTINUED | OUTPATIENT
Start: 2023-05-29 | End: 2023-06-01

## 2023-05-29 RX ORDER — BETAMETHASONE SODIUM PHOSPHATE AND BETAMETHASONE ACETATE 3; 3 MG/ML; MG/ML
12 INJECTION, SUSPENSION INTRA-ARTICULAR; INTRALESIONAL; INTRAMUSCULAR; SOFT TISSUE EVERY 24 HOURS
Status: COMPLETED | OUTPATIENT
Start: 2023-05-29 | End: 2023-05-30

## 2023-05-29 RX ORDER — NIFEDIPINE 10 MG/1
10 CAPSULE ORAL ONCE
Status: COMPLETED | OUTPATIENT
Start: 2023-05-29 | End: 2023-05-29

## 2023-05-29 RX ORDER — SODIUM CHLORIDE, SODIUM LACTATE, POTASSIUM CHLORIDE, CALCIUM CHLORIDE 600; 310; 30; 20 MG/100ML; MG/100ML; MG/100ML; MG/100ML
50 INJECTION, SOLUTION INTRAVENOUS CONTINUOUS
Status: DISCONTINUED | OUTPATIENT
Start: 2023-05-29 | End: 2023-06-01

## 2023-05-29 RX ORDER — MAGNESIUM CARB/ALUMINUM HYDROX 105-160MG
30 TABLET,CHEWABLE ORAL ONCE AS NEEDED
Status: DISCONTINUED | OUTPATIENT
Start: 2023-05-29 | End: 2023-06-01

## 2023-05-29 RX ORDER — ACETAMINOPHEN 325 MG/1
650 TABLET ORAL EVERY 6 HOURS PRN
Status: DISCONTINUED | OUTPATIENT
Start: 2023-05-29 | End: 2023-06-01

## 2023-05-29 RX ORDER — SODIUM CHLORIDE 0.9 % (FLUSH) 0.9 %
10 SYRINGE (ML) INJECTION AS NEEDED
Status: DISCONTINUED | OUTPATIENT
Start: 2023-05-29 | End: 2023-06-01

## 2023-05-29 RX ORDER — MAGNESIUM SULFATE HEPTAHYDRATE 40 MG/ML
2 INJECTION, SOLUTION INTRAVENOUS CONTINUOUS
Status: DISPENSED | OUTPATIENT
Start: 2023-05-29 | End: 2023-06-01

## 2023-05-29 RX ORDER — CALCIUM GLUCONATE 94 MG/ML
1 INJECTION, SOLUTION INTRAVENOUS ONCE AS NEEDED
Status: DISCONTINUED | OUTPATIENT
Start: 2023-05-29 | End: 2023-06-02 | Stop reason: HOSPADM

## 2023-05-29 RX ORDER — FAMOTIDINE 20 MG/1
20 TABLET, FILM COATED ORAL 2 TIMES DAILY PRN
Status: DISCONTINUED | OUTPATIENT
Start: 2023-05-29 | End: 2023-06-01

## 2023-05-29 RX ADMIN — NIFEDIPINE 10 MG: 10 CAPSULE ORAL at 18:37

## 2023-05-29 RX ADMIN — Medication 10 ML: at 20:39

## 2023-05-29 RX ADMIN — SODIUM CHLORIDE, POTASSIUM CHLORIDE, SODIUM LACTATE AND CALCIUM CHLORIDE 50 ML/HR: 600; 310; 30; 20 INJECTION, SOLUTION INTRAVENOUS at 19:42

## 2023-05-29 RX ADMIN — BETAMETHASONE SODIUM PHOSPHATE AND BETAMETHASONE ACETATE 12 MG: 3; 3 INJECTION, SUSPENSION INTRA-ARTICULAR; INTRALESIONAL; INTRAMUSCULAR at 18:18

## 2023-05-29 RX ADMIN — NIFEDIPINE 10 MG: 10 CAPSULE ORAL at 16:05

## 2023-05-29 RX ADMIN — ACETAMINOPHEN 650 MG: 325 TABLET, FILM COATED ORAL at 23:33

## 2023-05-29 RX ADMIN — DINOPROSTONE 10 MG: 10 INSERT VAGINAL at 21:15

## 2023-05-29 NOTE — PROGRESS NOTES
"SHARIFA Conway  Obstetric Progress Note    Subjective   Called to evaluate patient due to sustained severe range Bps with systolic > 159 x 2.   Patient still denies HA, vision changes, chest pain, dyspnea, or RUQ pain.     Objective     Vitals:  Vitals:    05/29/23 1645 05/29/23 1700 05/29/23 1715 05/29/23 1730   BP: 150/93 166/86 157/88 149/94   BP Location:       Patient Position:       Pulse: 104 102 97 102   Resp:       Temp:       TempSrc:       SpO2:       Weight:       Height:         Flowsheet Rows    Flowsheet Row First Filed Value   Admission Height 172.7 cm (68\") Documented at 05/29/2023 0940   Admission Weight 115 kg (253 lb 8.5 oz) Documented at 05/29/2023 0940        No intake or output data in the 24 hours ending 05/29/23 1746    Fetal Heart Rate Assessment:   Placed on monitor  [place]:  quiet    Physical Exam:  General: Patient is in no acute distress    Pelvic Exam: closed in office, recheck pending cervidil placement             Assessment & Plan     Principal Problem:    Pregnant and not yet delivered         Assessment:  1.  Intrauterine pregnancy at 36+3 wks by L=12 gestation.    2.  Preeclampsia with severe features, being severe range Bps, proteinuria status pending   3.  GBS status:Negative     Plan:  1.Admitted for observation and serial Bps check and now having severe range BP requiring antihypertensive, P/C ratio pending, UA with 1+ proteinuria which is a change from 5/15/23.  PER ACOG guidelines: severe range Bps are a sign of severe feature, therefore if patient has sustained severe range BP > 15 mins apart requiring antihypertensive OR a severe range BP > 4 hours apart then will start magnesium for seizure PPX and move towards delivery.  P/C ratio pending, but even with severe range Bps it will not  per ACOG delivery guidelines for preeclampsia and GHTN with severe features.   Other labs: LDH, Cr, LFTs, plts wnl, and patient reports no sx of severe features.   Anemia: Hb 10.7, " Anterior placenta.   For uterotonics if indicated: has exercise induced asthma and GHTN/possible preeclampsia.     -1605 procardia x 1 given due to sustained severe range Bps  - Placed on CEFM. Ordered BMZ dose x1 with repeat in 24 hours if still in labor. Magnesium initiated for seizure PPX. Us confirmed cephalic presentation.   - NICU APRN consulted due to late  IOL.   - Initiate induction with cervidil x 12 hours.   2. Plan of care has been reviewed with patient and partner.  3.  Risks, benefits of treatment plan have been discussed.  4.  All questions have been answered.      Nika Diane MD  2023  17:46 EDT

## 2023-05-29 NOTE — H&P
SHARIFA Conway  Obstetric History and Physical     Chief Complaint: known GHTN with severe range Bps at home.,     Subjective     Patient is a 32 y.o. female G1  currently at 36+3  , who presents from home with elevated BP and known diagnosis of GHTN.She reports systolic BP at home as high as 170s. She denies headache, vision changes, chest pain, dyspnea, RUQ pain, or new onset edema.     Her prenatal care is c/b GHTN, abnormal 1 hr and normal 3 hr, LGA, anemia, GERD, COVID positive in pregnancy, obesity, exercise induced asthma. and GERD.        Prenatal Information:  See office H&P for full details and labs.      Past OB History:       OB History    Para Term  AB Living   1 0 0 0 0 0   SAB IAB Ectopic Molar Multiple Live Births   0 0 0 0 0 0               Past Medical History: Past Medical History:   Diagnosis Date   • Exercise-induced asthma    • Kidney stones     kidney stones        Past Surgical History Past Surgical History:   Procedure Laterality Date   • US GUIDED FINE NEEDLE ASPIRATION  2021        Family History: Family History   Problem Relation Age of Onset   • Hyperthyroidism Mother    • Hypertension Mother    • Arthritis Father    • Hypertension Father       Social History:  reports that she has never smoked. She has never used smokeless tobacco.   reports no history of alcohol use.   reports no history of drug use.        General ROS: Pertinent items are noted in HPI    Objective      Vitals:     Vitals:    23 1030 23 1045 23 1100 23 1130   BP: (!) 150/105 146/93 152/94 134/76   BP Location:       Patient Position:       Pulse: 97 101 92 93   Resp:       Temp:       TempSrc:       SpO2: 100% 99% 99% 99%   Weight:       Height:           Fetal Heart Rate Assessment:   Category 1    [place]:   quiet     Physical Exam:     General Appearance:    Alert, cooperative, in no acute distress   Abdomen:     Soft, non-tender, no guarding, no rebound tenderness,       EFW 6#4 on  5/16 85%, had another growth US < 3 wks apart on 5/26 wutg 8#1 on 5/25, 99%tile   Pelvic Exam:    Pelvis adequate.    Presentation: Vertex on 5/26 US but will repeat if IOL indicated       Extremities:   Moves all extremities well, no edema, no cyanosis, no              redness   Skin:   No bleeding, bruising or rash   Neurologic:   No focal neurologic defect          Laboratory Results:   Lab Results (last 48 hours)     Procedure Component Value Units Date/Time    Comprehensive Metabolic Panel [[id number]]  (Abnormal) Collected: 05/29/23 1010    Specimen: Blood Updated: 05/29/23 1111     Glucose 99 mg/dL      BUN 5 mg/dL      Creatinine 0.52 mg/dL      Sodium 136 mmol/L      Potassium 4.0 mmol/L      Chloride 103 mmol/L      CO2 20.0 mmol/L      Calcium 9.1 mg/dL      Total Protein 6.3 g/dL      Albumin 3.3 g/dL      ALT (SGPT) 8 U/L      AST (SGOT) 14 U/L      Alkaline Phosphatase 158 U/L      Total Bilirubin <0.2 mg/dL      Globulin 3.0 gm/dL      A/G Ratio 1.1 g/dL      BUN/Creatinine Ratio 9.6     Anion Gap 13.0 mmol/L      eGFR 128.4 mL/min/1.73     Narrative:      GFR Normal >60  Chronic Kidney Disease <60  Kidney Failure <15      Lactate Dehydrogenase [[id number]]  (Normal) Collected: 05/29/23 1010    Specimen: Blood Updated: 05/29/23 1111      U/L     Urinalysis, Microscopic Only - Urine, Clean Catch [[id number]]  (Abnormal) Collected: 05/29/23 1027    Specimen: Urine, Clean Catch Updated: 05/29/23 1108     RBC, UA None Seen /HPF      WBC, UA 3-5 /HPF      Bacteria, UA Trace /HPF      Squamous Epithelial Cells, UA 3-6 /HPF      Hyaline Casts, UA None Seen /LPF      Calcium Oxalate Crystals, UA Moderate/2+ /HPF      Methodology Manual Light Microscopy    Urinalysis With Culture If Indicated - Urine, Clean Catch [[id number]]  (Abnormal) Collected: 05/29/23 1027    Specimen: Urine, Clean Catch Updated: 05/29/23 1103     Color, UA Yellow     Appearance, UA Cloudy     pH, UA 5.5     Specific Gravity, UA 1.027      Glucose, UA Negative     Ketones, UA Trace     Bilirubin, UA Negative     Blood, UA Negative     Protein, UA 30 mg/dL (1+)     Leuk Esterase, UA Trace     Nitrite, UA Negative     Urobilinogen, UA 1.0 E.U./dL    Narrative:      In absence of clinical symptoms, the presence of pyuria, bacteria, and/or nitrites on the urinalysis result does not correlate with infection.    CBC (No Diff) [[id number]]  (Abnormal) Collected: 05/29/23 1010    Specimen: Blood Updated: 05/29/23 1040     WBC 7.30 10*3/mm3      RBC 3.90 10*6/mm3      Hemoglobin 10.7 g/dL      Hematocrit 33.1 %      MCV 84.9 fL      MCH 27.5 pg      MCHC 32.4 g/dL      RDW 17.4 %      RDW-SD 51.6 fl      MPV 9.8 fL      Platelets 260 10*3/mm3     Urine Culture - Urine, Urine, Clean Catch [[id number]] Collected: 05/29/23 1027    Specimen: Urine, Clean Catch Updated: 05/29/23 1038    Protein / Creatinine Ratio, Urine - Urine, Clean Catch [[id number]] Collected: 05/29/23 1027    Specimen: Urine, Clean Catch Updated: 05/29/23 1033             Assessment & Plan     Principal Problem:    Pregnant and not yet delivered         Assessment:  1.  Intrauterine pregnancy at 36+3 wks by L=12 gestation.    2.  GHTN with severe range BP x 1, cannot rule out preeclampsia  3.  GBS status:Negative    Plan:  1.High range mild range Bps on admission,one severe range BP noted around 1400 but it was unsustained. Admitted for observation and serial Bps check, P/C ratio pending, UA with 1+ proteinuria which is a change from 5/15/23.  PER ACOG guidelines: severe range Bps are a sign of severe feature, therefore if patient has sustained severe range BP > 15 mins apart requiring antihypertensive OR a severe range BP > 4 hours apart then will start magnesium for seizure PPX and move towards delivery.  P/C ratio pending, but even with severe range Bps it will not .   Other labs: LDH, Cr, LFTs, plts wnl, and patient reports no sx of severe features.   Continue to monitor  at this time for possible delivery.   Anemia: Hb 10.7, Anterior placenta.   Last vertex on 5/25 so possible vaginal delivery pending cervical exam and or US results for fetal position.   If delivery is indicated for preE with SF will give BMZ since < 37 weeks and magnesium for seizure PPX.   For uterotonics if indicated: has exercise induced asthma and GHTN/possible preeclampsia.   2. Plan of care has been reviewed with patient.  3.  Risks, benefits of treatment plan have been discussed.  4.  All questions have been answered.         Nika Diane MD   5/29/2023   12:06 EDT

## 2023-05-29 NOTE — NON STRESS TEST
Obstetrical Non-stress Test Interpretation     Name:  Caterina Palacios  MRN: [id number]    32 y.o. female  at 36w3d    Indication: elevated BP      Baseline: Normal 110-160 bpm    Variability:   Moderate/Normal (amplitude 6-25 bpm)  Accelerations: Present (32 weeks+) 15 x 15 bpm  Decelerations: Absent   Contractions:  Absent       Impression:  Category I       Fatuma Cabral RN  2023  11:23 EDT      NST reviewed and agreed reactive by Dr. Benedicto MD

## 2023-05-30 LAB
BACTERIA SPEC AEROBE CULT: NORMAL
QT INTERVAL: 353 MS

## 2023-05-30 PROCEDURE — 25010000002 BETAMETHASONE ACET & SOD PHOS PER 4 MG: Performed by: STUDENT IN AN ORGANIZED HEALTH CARE EDUCATION/TRAINING PROGRAM

## 2023-05-30 PROCEDURE — 93005 ELECTROCARDIOGRAM TRACING: CPT | Performed by: STUDENT IN AN ORGANIZED HEALTH CARE EDUCATION/TRAINING PROGRAM

## 2023-05-30 PROCEDURE — 0 MAGNESIUM SULFATE 20 GM/500ML SOLUTION: Performed by: STUDENT IN AN ORGANIZED HEALTH CARE EDUCATION/TRAINING PROGRAM

## 2023-05-30 RX ORDER — LABETALOL 200 MG/1
200 TABLET, FILM COATED ORAL EVERY 8 HOURS SCHEDULED
Status: DISCONTINUED | OUTPATIENT
Start: 2023-05-30 | End: 2023-06-01

## 2023-05-30 RX ORDER — NIFEDIPINE 10 MG/1
10 CAPSULE ORAL ONCE
Status: COMPLETED | OUTPATIENT
Start: 2023-05-30 | End: 2023-05-30

## 2023-05-30 RX ORDER — MISOPROSTOL 100 MCG
25 TABLET ORAL
Status: DISCONTINUED | OUTPATIENT
Start: 2023-05-30 | End: 2023-05-31

## 2023-05-30 RX ORDER — NIFEDIPINE 30 MG/1
30 TABLET, EXTENDED RELEASE ORAL
Status: DISCONTINUED | OUTPATIENT
Start: 2023-05-30 | End: 2023-05-30

## 2023-05-30 RX ORDER — DESVENLAFAXINE SUCCINATE 50 MG/1
100 TABLET, EXTENDED RELEASE ORAL DAILY
Status: DISCONTINUED | OUTPATIENT
Start: 2023-05-30 | End: 2023-06-02

## 2023-05-30 RX ADMIN — Medication 25 MCG: at 16:26

## 2023-05-30 RX ADMIN — MAGNESIUM SULFATE HEPTAHYDRATE 2 G/HR: 40 INJECTION, SOLUTION INTRAVENOUS at 02:09

## 2023-05-30 RX ADMIN — MAGNESIUM SULFATE HEPTAHYDRATE 2 G/HR: 40 INJECTION, SOLUTION INTRAVENOUS at 23:23

## 2023-05-30 RX ADMIN — Medication 25 MCG: at 12:24

## 2023-05-30 RX ADMIN — NIFEDIPINE 10 MG: 10 CAPSULE ORAL at 05:26

## 2023-05-30 RX ADMIN — NIFEDIPINE 30 MG: 30 TABLET, EXTENDED RELEASE ORAL at 07:17

## 2023-05-30 RX ADMIN — DESVENLAFAXINE SUCCINATE 100 MG: 50 TABLET, EXTENDED RELEASE ORAL at 14:03

## 2023-05-30 RX ADMIN — SODIUM CHLORIDE, POTASSIUM CHLORIDE, SODIUM LACTATE AND CALCIUM CHLORIDE 50 ML/HR: 600; 310; 30; 20 INJECTION, SOLUTION INTRAVENOUS at 14:00

## 2023-05-30 RX ADMIN — Medication 25 MCG: at 20:20

## 2023-05-30 RX ADMIN — LABETALOL HYDROCHLORIDE 200 MG: 200 TABLET, FILM COATED ORAL at 18:54

## 2023-05-30 RX ADMIN — MAGNESIUM SULFATE HEPTAHYDRATE 2 G/HR: 40 INJECTION, SOLUTION INTRAVENOUS at 12:29

## 2023-05-30 RX ADMIN — BETAMETHASONE SODIUM PHOSPHATE AND BETAMETHASONE ACETATE 12 MG: 3; 3 INJECTION, SUSPENSION INTRA-ARTICULAR; INTRALESIONAL; INTRAMUSCULAR at 18:54

## 2023-05-30 RX ADMIN — ACETAMINOPHEN 650 MG: 325 TABLET, FILM COATED ORAL at 05:27

## 2023-05-30 NOTE — PLAN OF CARE
Goal Outcome Evaluation:  Plan of Care Reviewed With: patient        Progress: improving  Outcome Evaluation: Patient has been sleeping occasionally during the night. Cat 1 tracing with irregular contractions. Magnesium started at 1954. Pressures have been mild to severe with improvement with oral medications. Cervidil to be pulled at 9am.

## 2023-05-30 NOTE — PLAN OF CARE
Goal Outcome Evaluation:  Plan of Care Reviewed With: patient, spouse           Outcome Evaluation: Pt rested intermittent today, no visual changes, no headache, no epigastric pain. occasional or irregular ctx's with cytotec initiated today. b/p's gradually elevated to 160's sbp. adequate i/o's today.  MD notified today, labetalol started. will continue to monitor.

## 2023-05-30 NOTE — PROGRESS NOTES
S: Resting in bed, no s/sx of headache, vision changes, chest pain, dyspnea.     O: BP mostly mild range, last sustained SR BP around 0530 s/p tx with procardia 10mg PO.     A/P  31 y/o G1 with preeclampsia with SF, 36+4    Required treatment with procardia 10mg x 3 due to sustained severe range Bps since admission.   Will start on procardia 30mg XR, order placed.   Continue cervidil for 12 hours, reassess for further cervical ripening pending exam vs starting pitocin.   Continue magnesium for seizure PPX.   Second dose of BMZ due this evening.   Discussed plan of care with patient and agrees with above.

## 2023-05-31 PROBLEM — O14.13 SEVERE PREECLAMPSIA, THIRD TRIMESTER: Status: ACTIVE | Noted: 2023-05-31

## 2023-05-31 PROCEDURE — 0 MAGNESIUM SULFATE 20 GM/500ML SOLUTION: Performed by: STUDENT IN AN ORGANIZED HEALTH CARE EDUCATION/TRAINING PROGRAM

## 2023-05-31 PROCEDURE — 25010000002 BUTORPHANOL PER 1 MG: Performed by: STUDENT IN AN ORGANIZED HEALTH CARE EDUCATION/TRAINING PROGRAM

## 2023-05-31 RX ORDER — OXYTOCIN/0.9 % SODIUM CHLORIDE 30/500 ML
2-22 PLASTIC BAG, INJECTION (ML) INTRAVENOUS
Status: DISCONTINUED | OUTPATIENT
Start: 2023-05-31 | End: 2023-06-01

## 2023-05-31 RX ORDER — HYDROXYZINE HYDROCHLORIDE 25 MG/1
50 TABLET, FILM COATED ORAL EVERY 4 HOURS PRN
Status: DISCONTINUED | OUTPATIENT
Start: 2023-05-31 | End: 2023-06-01

## 2023-05-31 RX ORDER — BUTORPHANOL TARTRATE 1 MG/ML
1 INJECTION, SOLUTION INTRAMUSCULAR; INTRAVENOUS EVERY 4 HOURS PRN
Status: DISCONTINUED | OUTPATIENT
Start: 2023-05-31 | End: 2023-06-01

## 2023-05-31 RX ADMIN — LABETALOL HYDROCHLORIDE 200 MG: 200 TABLET, FILM COATED ORAL at 05:35

## 2023-05-31 RX ADMIN — DESVENLAFAXINE SUCCINATE 100 MG: 50 TABLET, EXTENDED RELEASE ORAL at 08:25

## 2023-05-31 RX ADMIN — Medication 4 MILLI-UNITS/MIN: at 15:30

## 2023-05-31 RX ADMIN — Medication 25 MCG: at 04:32

## 2023-05-31 RX ADMIN — Medication 25 MCG: at 00:35

## 2023-05-31 RX ADMIN — Medication 25 MCG: at 08:28

## 2023-05-31 RX ADMIN — MAGNESIUM SULFATE HEPTAHYDRATE 2 G/HR: 40 INJECTION, SOLUTION INTRAVENOUS at 09:04

## 2023-05-31 RX ADMIN — SODIUM CHLORIDE, POTASSIUM CHLORIDE, SODIUM LACTATE AND CALCIUM CHLORIDE 50 ML/HR: 600; 310; 30; 20 INJECTION, SOLUTION INTRAVENOUS at 09:04

## 2023-05-31 RX ADMIN — MAGNESIUM SULFATE HEPTAHYDRATE 2 G/HR: 40 INJECTION, SOLUTION INTRAVENOUS at 19:50

## 2023-05-31 RX ADMIN — Medication 2 MILLI-UNITS/MIN: at 14:30

## 2023-05-31 RX ADMIN — LABETALOL HYDROCHLORIDE 200 MG: 200 TABLET, FILM COATED ORAL at 13:55

## 2023-05-31 RX ADMIN — BUTORPHANOL TARTRATE 1 MG: 1 INJECTION, SOLUTION INTRAMUSCULAR; INTRAVENOUS at 22:02

## 2023-05-31 RX ADMIN — LABETALOL HYDROCHLORIDE 200 MG: 200 TABLET, FILM COATED ORAL at 22:02

## 2023-05-31 NOTE — PLAN OF CARE
Goal Outcome Evaluation:   Patient having mild discomfort with irregular contractions throughout the day. AROM w clear fluid. Pitocin started at 1430 . Cat 1/2 tracing. Magnesium at 2gm/hr and patient on Labetolol 200 TID. Bps in mild high range. Cont to monitor

## 2023-05-31 NOTE — PROGRESS NOTES
"SHARIFA Conway  Obstetric Progress Note    Subjective   Patient resting in bed. Reports no contractions and pain controlled.     Objective     Vitals:  Vitals:    05/31/23 0601 05/31/23 0631 05/31/23 0730 05/31/23 0800   BP: 140/72 130/75 120/63 119/58   Pulse: 104 101 95 96   Resp:       Temp:       TempSrc:       SpO2:   97% 97%   Weight:       Height:         Flowsheet Rows    Flowsheet Row First Filed Value   Admission Height 172.7 cm (68\") Documented at 05/29/2023 0940   Admission Weight 115 kg (253 lb 8.5 oz) Documented at 05/29/2023 0940          Intake/Output Summary (Last 24 hours) at 5/31/2023 0843  Last data filed at 5/31/2023 0830  Gross per 24 hour   Intake 4077 ml   Output 2500 ml   Net 1577 ml       Fetal Heart Rate Assessment:   Cat I   [employer]:  Intermittent contractions    Physical Exam:  General: Patient is in no acute distress    Pelvic Exam: SVE by RN 1/50/-2            Assessment & Plan     Principal Problem:    Pregnant and not yet delivered  Active Problems:    Severe preeclampsia, third trimester         Assessment:  1.  Intrauterine pregnancy at 36+5 wks by L=12 gestation.    2.  Preeclampsia with severe features, being severe range Bps  3.  GBS status:Negative     Plan:  1.Preeclampsia with severe features:   Labs: LDH, Cr, LFTs, plts wnl, and patient reports no sx of severe features.   Anemia: Hb 10.7, Anterior placenta.   For uterotonics if indicated: has exercise induced asthma and preeclampsia  Blood pressure reviewed- currently on labetalol 200mg q 8 hours.   Continue magnesium and scheduled antihypertensives.      5/29/23:  - Initiate induction with cervidil x 12 hours.     5/30/23:   - S/P cervidil for 12 hours, followed by Cytotec per chart review.     5/31/23:   - I resumed care of patient.   - She received approximately 6 doses of cytotec, last dose this AM.  - 0830: patient received cytotec x 6, I counseled patient on CRB and she wants to avoid if possible.   - Plan to recheck in 4-5 " hours and will attempt amniotomy at that time versus re-discuss CRB. Patient amenable to above plan.     2. Plan of care has been reviewed with patient and partner.  3.  Risks, benefits of treatment plan have been discussed.  4.  All questions have been answered.         Nika Diane MD  5/31/2023  08:43 EDT

## 2023-05-31 NOTE — PROGRESS NOTES
" Man  Obstetric Progress Note    Subjective   Patient resting in bed.   Denies HA, vision changes, chest pain, dyspnea, or difficulty breathing. No new concerns at this time.     Objective     Vitals:  Vitals:    05/31/23 1230 05/31/23 1300 05/31/23 1330 05/31/23 1400   BP: 143/81 151/83 158/82 145/89   Pulse: 101 101 107 106   Resp:       Temp:       TempSrc:       SpO2: 100%  100%    Weight:       Height:         Flowsheet Rows    Flowsheet Row First Filed Value   Admission Height 172.7 cm (68\") Documented at 05/29/2023 0940   Admission Weight 115 kg (253 lb 8.5 oz) Documented at 05/29/2023 0940          Intake/Output Summary (Last 24 hours) at 5/31/2023 1422  Last data filed at 5/31/2023 1230  Gross per 24 hour   Intake 4245 ml   Output 2350 ml   Net 1895 ml       Fetal Heart Rate Assessment:   Cat I  [place]:  Ctx  2-4 mins    Physical Exam:  General: Patient is in no acute distress    Pelvic Exam: SVE by me 1/75/-1, minimal clear fluid             Assessment & Plan     Principal Problem:    Pregnant and not yet delivered  Active Problems:    Severe preeclampsia, third trimester         Assessment:  1.Preeclampsia with severe features:   Labs: LDH, Cr, LFTs, plts wnl, and patient reports no sx of severe features.   Anemia: Hb 10.7, Anterior placenta.   For uterotonics if indicated: has exercise induced asthma and preeclampsia  Blood pressure reviewed- currently on labetalol 200mg q 8 hours.   BP normotensive to mild range, has not had any sustained severe range BPs. Continue magnesium and scheduled antihypertensives.     5/29/23:  - Initiate induction with cervidil x 12 hours.     5/30/23:   - S/P cervidil for 12 hours, followed by Cytotec per chart review.     5/31/23:   - I resumed care of patient.   - She received approximately 6 doses of cytotec, last dose this AM at 0830.  - 0830: patient received cytotec x 6, I counseled patient on CRB and she wants to avoid if possible.   - Plan to recheck in 4-5 hours " and will attempt amniotomy at that time versus re-discuss CRB. Patient amenable to above plan.   -1400: 1/75-1, CTX q 2-4 mins, Amniotomy performed and minimal clear fluid. Patient set up and confirmed rupture of membranes with moderate clear fluid noted. Plan to start pitocin and titrate it as tolerated. Recheck prn.     2. Plan of care has been reviewed with patient and partner.  3.  Risks, benefits of treatment plan have been discussed.  4.  All questions have been answered.        Nika Diane MD  5/31/2023  14:22 EDT

## 2023-05-31 NOTE — PLAN OF CARE
Goal Outcome Evaluation:  Plan of Care Reviewed With: patient        Progress: improving  Outcome Evaluation: Patient has been resting between care. BPs have improved with use of labetalol. Cat 1 tracing. Patient feeling contractions more frequently. Cytotec given throughout the night.

## 2023-05-31 NOTE — PROGRESS NOTES
"SHARIFA Conway  Obstetric Progress Note    Subjective   Feeling anxious. Discussed process of IOL and preeclampsia.    Objective     Vitals:  Vitals:    05/30/23 2000 05/30/23 2029 05/30/23 2030 05/30/23 2035   BP: 144/88 153/86 146/83    BP Location:       Patient Position:       Pulse: 120 106 111 109   Resp:    20   Temp:    98.3 °F (36.8 °C)   TempSrc:    Axillary   SpO2: 99%  98% 100%   Weight:       Height:         Flowsheet Rows    Flowsheet Row First Filed Value   Admission Height 172.7 cm (68\") Documented at 05/29/2023 0940   Admission Weight 115 kg (253 lb 8.5 oz) Documented at 05/29/2023 0940          Intake/Output Summary (Last 24 hours) at 5/30/2023 2037  Last data filed at 5/30/2023 2000  Gross per 24 hour   Intake 3842 ml   Output 3000 ml   Net 842 ml       Fetal Heart Rate Assessment:   130, mod variability  [place]:  occas ctx    Physical Exam:  General: Patient is in no acute distress    Pelvic Exam: 1/50/-2            Assessment & Plan     Principal Problem:    Pregnant and not yet delivered  Active Problems:    Severe preeclampsia, third trimester         Assessment:  1.  Intrauterine pregnancy at 36w4d gestation with reassuring fetal status.    2.  labor  without ROM  3.  GBS status:   External Strep Group B Ag   Date Value Ref Range Status   05/16/2023 NEG  Final     4.  FSR    Plan:  1.  Vaginal anticipated  2.  Magnesium for seizure prophylaxis, changed to scheduled labetalol for BP control. Can titer as needed.      Renee Sun MD  5/30/2023  20:37 EDT      "

## 2023-06-01 ENCOUNTER — ANESTHESIA (OUTPATIENT)
Dept: LABOR AND DELIVERY | Facility: HOSPITAL | Age: 33
End: 2023-06-01
Payer: COMMERCIAL

## 2023-06-01 ENCOUNTER — ANESTHESIA EVENT (OUTPATIENT)
Dept: LABOR AND DELIVERY | Facility: HOSPITAL | Age: 33
End: 2023-06-01
Payer: COMMERCIAL

## 2023-06-01 PROCEDURE — 25010000002 AZITHROMYCIN PER 500 MG: Performed by: STUDENT IN AN ORGANIZED HEALTH CARE EDUCATION/TRAINING PROGRAM

## 2023-06-01 PROCEDURE — 25010000002 KETOROLAC TROMETHAMINE PER 15 MG: Performed by: NURSE ANESTHETIST, CERTIFIED REGISTERED

## 2023-06-01 PROCEDURE — 25010000002 KETOROLAC TROMETHAMINE PER 15 MG: Performed by: OBSTETRICS & GYNECOLOGY

## 2023-06-01 PROCEDURE — 25010000002 ONDANSETRON PER 1 MG: Performed by: NURSE ANESTHETIST, CERTIFIED REGISTERED

## 2023-06-01 PROCEDURE — 25010000002 FENTANYL CITRATE (PF) 100 MCG/2ML SOLUTION: Performed by: NURSE ANESTHETIST, CERTIFIED REGISTERED

## 2023-06-01 PROCEDURE — 0 MAGNESIUM SULFATE 20 GM/500ML SOLUTION: Performed by: STUDENT IN AN ORGANIZED HEALTH CARE EDUCATION/TRAINING PROGRAM

## 2023-06-01 PROCEDURE — 25010000002 OXYTOCIN PER 10 UNITS: Performed by: ANESTHESIOLOGY

## 2023-06-01 PROCEDURE — 25010000002 METOCLOPRAMIDE PER 10 MG: Performed by: STUDENT IN AN ORGANIZED HEALTH CARE EDUCATION/TRAINING PROGRAM

## 2023-06-01 RX ORDER — LIDOCAINE HYDROCHLORIDE 10 MG/ML
5 INJECTION, SOLUTION EPIDURAL; INFILTRATION; INTRACAUDAL; PERINEURAL AS NEEDED
Status: DISCONTINUED | OUTPATIENT
Start: 2023-06-01 | End: 2023-06-01

## 2023-06-01 RX ORDER — ACETAMINOPHEN 500 MG
1000 TABLET ORAL 4 TIMES DAILY
Status: DISCONTINUED | OUTPATIENT
Start: 2023-06-01 | End: 2023-06-01

## 2023-06-01 RX ORDER — BUPIVACAINE HYDROCHLORIDE 7.5 MG/ML
INJECTION, SOLUTION INTRASPINAL AS NEEDED
Status: DISCONTINUED | OUTPATIENT
Start: 2023-06-01 | End: 2023-06-01 | Stop reason: SURG

## 2023-06-01 RX ORDER — SODIUM CHLORIDE 0.9 % (FLUSH) 0.9 %
10 SYRINGE (ML) INJECTION AS NEEDED
Status: DISCONTINUED | OUTPATIENT
Start: 2023-06-01 | End: 2023-06-01

## 2023-06-01 RX ORDER — DOCUSATE SODIUM 100 MG/1
100 CAPSULE, LIQUID FILLED ORAL 2 TIMES DAILY PRN
Status: DISCONTINUED | OUTPATIENT
Start: 2023-06-01 | End: 2023-06-03 | Stop reason: HOSPADM

## 2023-06-01 RX ORDER — KETOROLAC TROMETHAMINE 30 MG/ML
INJECTION, SOLUTION INTRAMUSCULAR; INTRAVENOUS AS NEEDED
Status: DISCONTINUED | OUTPATIENT
Start: 2023-06-01 | End: 2023-06-01 | Stop reason: SURG

## 2023-06-01 RX ORDER — ACETAMINOPHEN 325 MG/1
650 TABLET ORAL EVERY 6 HOURS
Status: DISCONTINUED | OUTPATIENT
Start: 2023-06-02 | End: 2023-06-03 | Stop reason: HOSPADM

## 2023-06-01 RX ORDER — SIMETHICONE 80 MG
80 TABLET,CHEWABLE ORAL 4 TIMES DAILY PRN
Status: DISCONTINUED | OUTPATIENT
Start: 2023-06-01 | End: 2023-06-03 | Stop reason: HOSPADM

## 2023-06-01 RX ORDER — OXYTOCIN/0.9 % SODIUM CHLORIDE 30/500 ML
250 PLASTIC BAG, INJECTION (ML) INTRAVENOUS CONTINUOUS
Status: DISCONTINUED | OUTPATIENT
Start: 2023-06-01 | End: 2023-06-01 | Stop reason: SDUPTHER

## 2023-06-01 RX ORDER — CARBOPROST TROMETHAMINE 250 UG/ML
250 INJECTION, SOLUTION INTRAMUSCULAR
Status: DISCONTINUED | OUTPATIENT
Start: 2023-06-01 | End: 2023-06-02 | Stop reason: HOSPADM

## 2023-06-01 RX ORDER — IBUPROFEN 600 MG/1
600 TABLET ORAL EVERY 6 HOURS
Status: DISCONTINUED | OUTPATIENT
Start: 2023-06-02 | End: 2023-06-03 | Stop reason: HOSPADM

## 2023-06-01 RX ORDER — OXYCODONE HYDROCHLORIDE 5 MG/1
10 TABLET ORAL EVERY 4 HOURS PRN
Status: DISCONTINUED | OUTPATIENT
Start: 2023-06-01 | End: 2023-06-03 | Stop reason: HOSPADM

## 2023-06-01 RX ORDER — OXYTOCIN/0.9 % SODIUM CHLORIDE 30/500 ML
999 PLASTIC BAG, INJECTION (ML) INTRAVENOUS ONCE
Status: DISCONTINUED | OUTPATIENT
Start: 2023-06-01 | End: 2023-06-01 | Stop reason: SDUPTHER

## 2023-06-01 RX ORDER — ONDANSETRON 2 MG/ML
4 INJECTION INTRAMUSCULAR; INTRAVENOUS ONCE AS NEEDED
Status: DISCONTINUED | OUTPATIENT
Start: 2023-06-01 | End: 2023-06-01

## 2023-06-01 RX ORDER — CEFAZOLIN SODIUM IN 0.9 % NACL 3 G/100 ML
3 INTRAVENOUS SOLUTION, PIGGYBACK (ML) INTRAVENOUS ONCE
Status: COMPLETED | OUTPATIENT
Start: 2023-06-01 | End: 2023-06-01

## 2023-06-01 RX ORDER — ACETAMINOPHEN 500 MG
1000 TABLET ORAL ONCE
Status: COMPLETED | OUTPATIENT
Start: 2023-06-01 | End: 2023-06-01

## 2023-06-01 RX ORDER — ACETAMINOPHEN 500 MG
1000 TABLET ORAL EVERY 6 HOURS
Status: COMPLETED | OUTPATIENT
Start: 2023-06-01 | End: 2023-06-02

## 2023-06-01 RX ORDER — KETOROLAC TROMETHAMINE 30 MG/ML
30 INJECTION, SOLUTION INTRAMUSCULAR; INTRAVENOUS ONCE
Status: DISCONTINUED | OUTPATIENT
Start: 2023-06-01 | End: 2023-06-01 | Stop reason: SDUPTHER

## 2023-06-01 RX ORDER — CARBOPROST TROMETHAMINE 250 UG/ML
250 INJECTION, SOLUTION INTRAMUSCULAR
Status: DISCONTINUED | OUTPATIENT
Start: 2023-06-01 | End: 2023-06-01 | Stop reason: SDUPTHER

## 2023-06-01 RX ORDER — OXYTOCIN/0.9 % SODIUM CHLORIDE 30/500 ML
250 PLASTIC BAG, INJECTION (ML) INTRAVENOUS CONTINUOUS
Status: DISPENSED | OUTPATIENT
Start: 2023-06-01 | End: 2023-06-01

## 2023-06-01 RX ORDER — METOCLOPRAMIDE HYDROCHLORIDE 5 MG/ML
10 INJECTION INTRAMUSCULAR; INTRAVENOUS ONCE AS NEEDED
Status: COMPLETED | OUTPATIENT
Start: 2023-06-01 | End: 2023-06-01

## 2023-06-01 RX ORDER — OXYCODONE HYDROCHLORIDE 5 MG/1
5 TABLET ORAL EVERY 4 HOURS PRN
Status: DISCONTINUED | OUTPATIENT
Start: 2023-06-01 | End: 2023-06-03 | Stop reason: HOSPADM

## 2023-06-01 RX ORDER — MAGNESIUM SULFATE HEPTAHYDRATE 40 MG/ML
2 INJECTION, SOLUTION INTRAVENOUS CONTINUOUS
Status: DISPENSED | OUTPATIENT
Start: 2023-06-01 | End: 2023-06-02

## 2023-06-01 RX ORDER — KETOROLAC TROMETHAMINE 30 MG/ML
30 INJECTION, SOLUTION INTRAMUSCULAR; INTRAVENOUS EVERY 6 HOURS
Status: DISCONTINUED | OUTPATIENT
Start: 2023-06-01 | End: 2023-06-01

## 2023-06-01 RX ORDER — FENTANYL CITRATE 50 UG/ML
INJECTION, SOLUTION INTRAMUSCULAR; INTRAVENOUS AS NEEDED
Status: DISCONTINUED | OUTPATIENT
Start: 2023-06-01 | End: 2023-06-01 | Stop reason: SURG

## 2023-06-01 RX ORDER — OXYTOCIN/0.9 % SODIUM CHLORIDE 30/500 ML
999 PLASTIC BAG, INJECTION (ML) INTRAVENOUS ONCE
Status: DISCONTINUED | OUTPATIENT
Start: 2023-06-01 | End: 2023-06-02 | Stop reason: HOSPADM

## 2023-06-01 RX ORDER — METHYLERGONOVINE MALEATE 0.2 MG/ML
200 INJECTION INTRAVENOUS ONCE AS NEEDED
Status: DISCONTINUED | OUTPATIENT
Start: 2023-06-01 | End: 2023-06-02 | Stop reason: HOSPADM

## 2023-06-01 RX ORDER — KETOROLAC TROMETHAMINE 30 MG/ML
15 INJECTION, SOLUTION INTRAMUSCULAR; INTRAVENOUS EVERY 6 HOURS
Status: COMPLETED | OUTPATIENT
Start: 2023-06-01 | End: 2023-06-02

## 2023-06-01 RX ORDER — METHYLERGONOVINE MALEATE 0.2 MG/ML
200 INJECTION INTRAVENOUS ONCE AS NEEDED
Status: DISCONTINUED | OUTPATIENT
Start: 2023-06-01 | End: 2023-06-01 | Stop reason: SDUPTHER

## 2023-06-01 RX ORDER — LABETALOL 200 MG/1
200 TABLET, FILM COATED ORAL EVERY 8 HOURS SCHEDULED
Status: DISCONTINUED | OUTPATIENT
Start: 2023-06-01 | End: 2023-06-03 | Stop reason: HOSPADM

## 2023-06-01 RX ORDER — PHENYLEPHRINE HCL IN 0.9% NACL 1 MG/10 ML
SYRINGE (ML) INTRAVENOUS AS NEEDED
Status: DISCONTINUED | OUTPATIENT
Start: 2023-06-01 | End: 2023-06-01 | Stop reason: SURG

## 2023-06-01 RX ORDER — MISOPROSTOL 200 UG/1
800 TABLET ORAL ONCE AS NEEDED
Status: DISCONTINUED | OUTPATIENT
Start: 2023-06-01 | End: 2023-06-02 | Stop reason: HOSPADM

## 2023-06-01 RX ORDER — NAPROXEN 250 MG/1
250 TABLET ORAL EVERY 12 HOURS PRN
Status: DISCONTINUED | OUTPATIENT
Start: 2023-06-01 | End: 2023-06-01

## 2023-06-01 RX ORDER — ONDANSETRON 2 MG/ML
INJECTION INTRAMUSCULAR; INTRAVENOUS AS NEEDED
Status: DISCONTINUED | OUTPATIENT
Start: 2023-06-01 | End: 2023-06-01 | Stop reason: SURG

## 2023-06-01 RX ORDER — MISOPROSTOL 200 UG/1
800 TABLET ORAL ONCE AS NEEDED
Status: DISCONTINUED | OUTPATIENT
Start: 2023-06-01 | End: 2023-06-01 | Stop reason: SDUPTHER

## 2023-06-01 RX ORDER — OXYTOCIN 10 [USP'U]/ML
INJECTION, SOLUTION INTRAMUSCULAR; INTRAVENOUS AS NEEDED
Status: DISCONTINUED | OUTPATIENT
Start: 2023-06-01 | End: 2023-06-01 | Stop reason: SURG

## 2023-06-01 RX ORDER — TRISODIUM CITRATE DIHYDRATE AND CITRIC ACID MONOHYDRATE 500; 334 MG/5ML; MG/5ML
30 SOLUTION ORAL ONCE
Status: COMPLETED | OUTPATIENT
Start: 2023-06-01 | End: 2023-06-01

## 2023-06-01 RX ORDER — SODIUM CHLORIDE 0.9 % (FLUSH) 0.9 %
10 SYRINGE (ML) INJECTION EVERY 12 HOURS SCHEDULED
Status: DISCONTINUED | OUTPATIENT
Start: 2023-06-01 | End: 2023-06-01

## 2023-06-01 RX ADMIN — Medication 100 MCG: at 08:05

## 2023-06-01 RX ADMIN — FENTANYL CITRATE 25 MCG: 50 INJECTION, SOLUTION INTRAMUSCULAR; INTRAVENOUS at 07:56

## 2023-06-01 RX ADMIN — DESVENLAFAXINE SUCCINATE 100 MG: 50 TABLET, EXTENDED RELEASE ORAL at 10:44

## 2023-06-01 RX ADMIN — Medication 100 MCG: at 08:12

## 2023-06-01 RX ADMIN — ACETAMINOPHEN 1000 MG: 500 TABLET, FILM COATED ORAL at 07:30

## 2023-06-01 RX ADMIN — LABETALOL HYDROCHLORIDE 200 MG: 200 TABLET, FILM COATED ORAL at 13:10

## 2023-06-01 RX ADMIN — ACETAMINOPHEN 1000 MG: 500 TABLET, FILM COATED ORAL at 22:00

## 2023-06-01 RX ADMIN — ACETAMINOPHEN 1000 MG: 500 TABLET, FILM COATED ORAL at 14:59

## 2023-06-01 RX ADMIN — BUPIVACAINE HYDROCHLORIDE IN DEXTROSE 1.8 ML: 7.5 INJECTION, SOLUTION SUBARACHNOID at 07:56

## 2023-06-01 RX ADMIN — KETOROLAC TROMETHAMINE 30 MG: 30 INJECTION, SOLUTION INTRAMUSCULAR at 08:15

## 2023-06-01 RX ADMIN — KETOROLAC TROMETHAMINE 15 MG: 30 INJECTION, SOLUTION INTRAMUSCULAR; INTRAVENOUS at 13:57

## 2023-06-01 RX ADMIN — Medication 3 G: at 07:55

## 2023-06-01 RX ADMIN — METOCLOPRAMIDE 10 MG: 5 INJECTION, SOLUTION INTRAMUSCULAR; INTRAVENOUS at 07:27

## 2023-06-01 RX ADMIN — ONDANSETRON 8 MG: 2 INJECTION INTRAMUSCULAR; INTRAVENOUS at 07:45

## 2023-06-01 RX ADMIN — MAGNESIUM SULFATE HEPTAHYDRATE 2 G/HR: 40 INJECTION, SOLUTION INTRAVENOUS at 19:54

## 2023-06-01 RX ADMIN — LABETALOL HYDROCHLORIDE 200 MG: 200 TABLET, FILM COATED ORAL at 05:47

## 2023-06-01 RX ADMIN — AZITHROMYCIN MONOHYDRATE 500 MG: 500 INJECTION, POWDER, LYOPHILIZED, FOR SOLUTION INTRAVENOUS at 07:28

## 2023-06-01 RX ADMIN — LABETALOL HYDROCHLORIDE 200 MG: 200 TABLET, FILM COATED ORAL at 22:03

## 2023-06-01 RX ADMIN — MAGNESIUM SULFATE HEPTAHYDRATE 2 G/HR: 40 INJECTION, SOLUTION INTRAVENOUS at 05:47

## 2023-06-01 RX ADMIN — OXYTOCIN 30 UNITS: 10 INJECTION INTRAVENOUS at 08:11

## 2023-06-01 RX ADMIN — KETOROLAC TROMETHAMINE 15 MG: 30 INJECTION, SOLUTION INTRAMUSCULAR; INTRAVENOUS at 19:58

## 2023-06-01 RX ADMIN — SODIUM CITRATE AND CITRIC ACID MONOHYDRATE 30 ML: 500; 334 SOLUTION ORAL at 07:28

## 2023-06-01 RX ADMIN — OXYCODONE 5 MG: 5 TABLET ORAL at 10:44

## 2023-06-01 NOTE — ANESTHESIA PREPROCEDURE EVALUATION
Anesthesia Evaluation     Patient summary reviewed and Nursing notes reviewed   NPO Solid Status: > 8 hours  NPO Liquid Status: > 8 hours           Airway   Mallampati: II  TM distance: >3 FB  Neck ROM: full  No difficulty expected  Dental - normal exam     Pulmonary    (+) asthma,  Cardiovascular     (+) hypertension,       Neuro/Psych  GI/Hepatic/Renal/Endo    (+)   renal disease, thyroid problem     Musculoskeletal     Abdominal    Substance History      OB/GYN    (+) Pregnant, Preeclampsia, pregnancy induced hypertension        Other                        Anesthesia Plan    ASA 3     spinal       Anesthetic plan, risks, benefits, and alternatives have been provided, discussed and informed consent has been obtained with: patient.    Plan discussed with CRNA.        CODE STATUS:

## 2023-06-01 NOTE — L&D DELIVERY NOTE
[facility]   Section Operative Note    Pre-Operative Dx:   1.  Patient is a 32 y.o. female  currently at 36w6d, who presents with IOL d/t severe preE.    2. Arrest of dilation/ failed IOL        Postoperative dx:    1.  Same     Procedure: Primary LTCS     Surgeon:    Assistant:                       Shana Rockwell MD    None       Anesthesia:  Anesthesiologist:  Gerry Porter     EBL:  800cc     Antibiotics: cefazolin (Ancef)     Infant    Findings: VMI     Apgars: 8 & 9 at 1 and 5 minutes.        Procedure Details:     Pt was taken to the OR where she was prepped and draped in the usual sterile fashion with a catheter and a left tilt.  Anesthesia was found to be adequate.    A Pfannenstiel skin incision was made with the scalpel and carried through to the underlying layer of fascia with the scalpel.  The fascial incision was extended laterally with the Bonilla scissors and  from the underlying rectus muscles superiorly.  The rectus muscles were  in the midline and the peritoneum was entered sharply with the Bonilla scissors.  The peritoneal incision was extended laterally. The Sunil retractor was placed.   A low transverse uterine incision was made with the scalpel and extended in a cephalocaudad manner manually.    The infant's head, shoulders, and body were delivered without difficulty.  Nose and mouth were bulb suctioned and infant handed to awaiting nurse with good cry, color, tone, and movement of all extremities.    Placenta was delivered spontaneously intact with a three vessel cord.    The uterus was exteriorized and cleared of all clots and debris.    The uterine incision was repaired with 0 Monocryl in a running, locked fashion and excellent hemostasis was achieved.    The uterus was returned to the abdomen, the gutters were cleared of all clots and debris.  The uterine incision was examined and hemostatic in situ.     The peritoneum was reapproximated with 3.0 Monocryl in a  running fashion.  The rectus muscles were reapproximated with 0 Monocryl in several simple interrupted sutures.    The fascia was closed with 0 Vicryl in a running, locked fashion.    The subcutaneous fat was irrigated and closed with 3.0 Monocryl.    The skin was closed with 3.0 Vicryl in a running  fashion.  Sponge, lap, and needle counts were correct x 2.        Complications:   None      Disposition:   Mother to Mother Baby/Postpartum  in stable condition currently.   Baby to NBN  in stable condition currently.       Shana Rockwell MD  6/1/2023  07:53 EDT

## 2023-06-01 NOTE — PROGRESS NOTES
Pt chart reviewed and case reviewed c Dr. Diane.  I assumed care at 0700.  Reviewed c pt informed consent for C/S delivery d/t failed IOL.  Pt agrees to plan, no change from note from Dr. Diane at 0705 this am.  Will proceed c  delivery. R/B/A reviewed c pt again myself prior to going to OR. Pt and  agree to plan.

## 2023-06-01 NOTE — ANESTHESIA POSTPROCEDURE EVALUATION
Patient: Caterina Palacios    Procedure Summary     Date: 23 Room / Location: [facility] LABOR DELIVERY  [facility] LABOR DELIVERY    Anesthesia Start: 745 Anesthesia Stop: 833    Procedure:  SECTION PRIMARY (Abdomen) Diagnosis: (Failure to Progress)    Surgeons: Shana Rockwell MD Provider: Stephen Porter MD    Anesthesia Type: spinal ASA Status: 3          Anesthesia Type: spinal    Vitals  Vitals Value Taken Time   /80 23 1400   Temp 97.7 °F (36.5 °C) 23 0845   Pulse 95 23 1400   Resp 18 23 1100   SpO2 99 % 23 1159   Vitals shown include unvalidated device data.        Post Anesthesia Care and Evaluation    Patient location during evaluation: PACU  Patient participation: complete - patient participated  Level of consciousness: awake  Pain scale: See nurse's notes for pain score.  Pain management: adequate    Airway patency: patent  Anesthetic complications: No anesthetic complications  PONV Status: none  Cardiovascular status: acceptable  Respiratory status: acceptable and spontaneous ventilation  Hydration status: acceptable  Post Neuraxial Block status: Motor and sensory function returned to baseline and No signs or symptoms of PDPH  Comments: Patient seen and examined postoperatively; vital signs stable; SpO2 greater than or equal to 90%; cardiopulmonary status stable; nausea/vomiting adequately controlled; pain adequately controlled; no apparent anesthesia complications; patient discharged from anesthesia care when discharge criteria were met

## 2023-06-01 NOTE — PLAN OF CARE
Goal Outcome Evaluation:  Plan of Care Reviewed With: patient        Progress: no change     Contractions irregular, no cervical change noted on exam per MD this shift. Patient had 1 dose of stadol this shift. Resting comfortably in bed at this time. Feeling contractions but not more intense than previous. Category 1 tracing. Pitocin infusing at maximum ordered dose. UOP ~200mL/hr this shift. Remains free of s/s of MgSO4 toxicity. MD plans to evaluate this AM to determine treatment plan.

## 2023-06-01 NOTE — PROGRESS NOTES
"SHARIFA Conway  Obstetric Progress Note    Subjective   Patient in bed, having more pain, rates contractions 8/10 and more regular then prior.  She is considering getting epidural at this time.     Objective     Vitals:  Vitals:    05/31/23 2000 05/31/23 2030 05/31/23 2100 05/31/23 2130   BP: 153/83 143/76 128/61 141/69   BP Location: Right arm Right arm Right arm Right arm   Patient Position: Sitting Sitting Lying Lying   Pulse: 96 88 94 90   Resp: 19 18     Temp: 98.7 °F (37.1 °C)      TempSrc: Oral      SpO2: 100% 99% 100% 99%   Weight:       Height:         Flowsheet Rows    Flowsheet Row First Filed Value   Admission Height 172.7 cm (68\") Documented at 05/29/2023 0940   Admission Weight 115 kg (253 lb 8.5 oz) Documented at 05/29/2023 0940          Intake/Output Summary (Last 24 hours) at 5/31/2023 2200  Last data filed at 5/31/2023 2109  Gross per 24 hour   Intake 2962.3 ml   Output 1750 ml   Net 1212.3 ml       Fetal Heart Rate Assessment:   Cat I  [place]:  Difficult to trace, pt reports q 2-4 mins    Physical Exam:  General: Patient is in no acute distress    Pelvic Exam: unchanged, 1/75/-1            Assessment & Plan     Principal Problem:    Pregnant and not yet delivered  Active Problems:    Severe preeclampsia, third trimester         Assessment:  1.Preeclampsia with severe features:   Labs: LDH, Cr, LFTs, plts wnl, and patient reports no sx of severe features.   Anemia: Hb 10.7, Anterior placenta.   For uterotonics if indicated: has exercise induced asthma and preeclampsia  Blood pressure reviewed- currently on labetalol 200mg q 8 hours.   Did have an unsustained SR BP, now mild range, continue to monitor. Continue magnesium and scheduled antihypertensives.      5/29/23:  - Initiate induction with cervidil x 12 hours.      5/30/23:   - S/P cervidil for 12 hours, followed by Cytotec per chart review.      5/31/23:   - I resumed care of patient.   - She received approximately 6 doses of cytotec, last dose this AM " at 0830.  - 0830: patient received cytotec x 6, I counseled patient on CRB and she wants to avoid if possible.   - Plan to recheck in 4-5 hours and will attempt amniotomy at that time versus re-discuss CRB. Patient amenable to above plan.   -1400: 1/75-1, CTX q 2-4 mins, Amniotomy performed and minimal clear fluid. Patient set up and confirmed rupture of membranes with moderate clear fluid noted. Plan to start pitocin and titrate it as tolerated. Recheck prn.   -2200: unchanged 1/75/-1, pitocin of 12 units, lalitha every 2-4 mins per patient report, attempted to place IUPC and patient could not tolerate. She desires IV pain medication but is considering epidural. Counseled patient on signs of sx of failed induction of labor. Discussed that based on ACOGs criteria for failed induction and preventing a primary CD due to requiring ongoing cervical ripening: allow up to 12-18 hours following amniotomy with pitocin before deeming it an failed induction of labor. Patient also asked if she could choose to have a CD at this time, I discussed with patient that she could choose to have an elective CD in the setting of a prolonged induction plus no cervical change and preeclampsia with severe features. Patient counseled on risks and benefits of PLTCD vs waiting to see if cervical change. Patient discussed with her  and she wants to wait unless there are fetal indications for CD. Continue to titrate pitocin as tolerated.      2. Plan of care has been reviewed with patient and partner.  3.  Risks, benefits of treatment plan have been discussed.  4.  All questions have been answered.         Nika Diane MD  5/31/2023  22:00 EDT

## 2023-06-01 NOTE — ANESTHESIA PROCEDURE NOTES
Spinal Block    Pre-sedation assessment completed: 6/1/2023 8:13 AM    Performed By  Anesthesiologist: Stephen Porter MD  Preanesthetic Checklist  Completed: patient identified, IV checked, site marked, risks and benefits discussed, surgical consent, monitors and equipment checked, pre-op evaluation and timeout performed  Spinal Block Prep:  Patient Position:sitting  Sterile Tech:cap, gloves, mask and sterile barriers  Prep:Chloraprep  Patient Monitoring:blood pressure monitoring, continuous pulse oximetry and EKG    Spinal Block Procedure  Approach:midline  Location:L2-L3  Needle Type:Sprotte  Needle Gauge:25 G    Fluid Appearance:clear     Post Assessment  Patient Tolerance:patient tolerated the procedure well with no apparent complications  Complications no

## 2023-06-01 NOTE — PROGRESS NOTES
" Man  Obstetric Progress Note    Subjective   Patient resting in bed. No concerns. No HA, vision changes, chest pain or dyspnea.     Objective     Vitals:  Vitals:    06/01/23 0500 06/01/23 0530 06/01/23 0600 06/01/23 0640   BP: 161/89 148/89 151/86 127/71   BP Location: Right arm Right arm Right arm Right arm   Patient Position: Lying Lying Lying Lying   Pulse: 92 90 85 81   Resp:   18    Temp:   98.3 °F (36.8 °C)    TempSrc:   Oral    SpO2:  99% 98% 99%   Weight:       Height:         Flowsheet Rows    Flowsheet Row First Filed Value   Admission Height 172.7 cm (68\") Documented at 05/29/2023 0940   Admission Weight 115 kg (253 lb 8.5 oz) Documented at 05/29/2023 0940          Intake/Output Summary (Last 24 hours) at 6/1/2023 0706  Last data filed at 6/1/2023 0627  Gross per 24 hour   Intake 2736.4 ml   Output 2400 ml   Net 336.4 ml       Fetal Heart Rate Assessment:   Cat I  [place]:  Pt reports intermittent even at pitocin of 22 units she was very comfortable in bed, toco does not  wekk     Physical Exam:  General: Patient is in no acute distress    Pelvic Exam: unchanged, 1/75/-1            Assessment & Plan     Principal Problem:    Pregnant and not yet delivered  Active Problems:    Severe preeclampsia, third trimester         Assessment: 31 y/o G1 36+6 with failed IOL, IOL secondary to preeclampsia with SF.     1.Preeclampsia with severe features:   Labs: LDH, Cr, LFTs, plts wnl, and patient reports no sx of severe features.   Anemia: Hb 10.7, Anterior placenta.   For uterotonics if indicated: has exercise induced asthma and preeclampsia  Blood pressure reviewed- currently on labetalol 200mg q 8 hours.   Did have an unsustained SR BP, now mild range, continue to monitor. Continue magnesium and scheduled antihypertensives.      5/29/23:  - Initiate induction with cervidil x 12 hours.      5/30/23:   - S/P cervidil for 12 hours, followed by Cytotec per chart review.      5/31/23:   - I resumed care of " patient.   - She received approximately 6 doses of cytotec, last dose this AM at 0830.  - 0830: patient received cytotec x 6, I counseled patient on CRB and she wants to avoid if possible.   - Plan to recheck in 4-5 hours and will attempt amniotomy at that time versus re-discuss CRB. Patient amenable to above plan.   -1400: 1/75-1, CTX q 2-4 mins, Amniotomy performed and minimal clear fluid. Patient set up and confirmed rupture of membranes with moderate clear fluid noted. Plan to start pitocin and titrate it as tolerated. Recheck prn.   -2200: unchanged 1/75/-1, pitocin of 12 units, lalitha every 2-4 mins per patient report, attempted to place IUPC and patient could not tolerate. She desires IV pain medication but is considering epidural. Counseled patient on signs of sx of failed induction of labor. Discussed that based on ACOGs criteria for failed induction and preventing a primary CD due to requiring ongoing cervical ripening: allow up to 12-18 hours following amniotomy with pitocin before deeming it an failed induction of labor. Patient also asked if she could choose to have a CD at this time, I discussed with patient that she could choose to have an elective CD in the setting of a prolonged induction plus no cervical change and preeclampsia with severe features. Patient counseled on risks and benefits of PLTCD vs waiting to see if cervical change. Patient discussed with her  and she wants to wait unless there are fetal indications for CD. Continue to titrate pitocin as tolerated.     6/1/23:   - Cat I, SVE unchanged, 1/75/-1, discussed now 18 hours s/p AROM with no cervical change counseled on signs of failed IOL and failure of labor to progress, even with pitocin getting as high as 22 units. Discussed PLTCD for failed IOL. Patient and partner agrees with above plan. Anesthesia and nursing staff made aware. Pitocin discontinued. Azithromycin + kefozol ordered for PPX antibiotics due to prolonged  rupture of membranes. NICU APRN to be present at delivery due to  and meconium stained fluid.       2. Plan of care has been reviewed with patient and partner.  3.  Risks, benefits of treatment plan have been discussed.  4.  All questions have been answered.      Nika Diane MD  2023  07:06 EDT

## 2023-06-01 NOTE — LACTATION NOTE
This note was copied from a baby's chart.  Pt denies hx of breast surgery, no allergy to wool or foods, Medela gel patches provided, instructed on use.   She has a Spectra pump at home. Takes Pristiq, prenatal vitamins. Plans to return to work after 12 weeks.   Basic teaching done, assisted to position, demo wide latch, baby nursing readily for a few minutes, colostrum expressed, baby licking.

## 2023-06-02 LAB
BASOPHILS # BLD AUTO: 0 10*3/MM3 (ref 0–0.2)
BASOPHILS NFR BLD AUTO: 0.1 % (ref 0–1.5)
DEPRECATED RDW RBC AUTO: 55.1 FL (ref 37–54)
EOSINOPHIL # BLD AUTO: 0 10*3/MM3 (ref 0–0.4)
EOSINOPHIL NFR BLD AUTO: 0.2 % (ref 0.3–6.2)
ERYTHROCYTE [DISTWIDTH] IN BLOOD BY AUTOMATED COUNT: 17.7 % (ref 12.3–15.4)
HCT VFR BLD AUTO: 24.9 % (ref 34–46.6)
HGB BLD-MCNC: 8.2 G/DL (ref 12–15.9)
LYMPHOCYTES # BLD AUTO: 1.2 10*3/MM3 (ref 0.7–3.1)
LYMPHOCYTES NFR BLD AUTO: 11.1 % (ref 19.6–45.3)
MCH RBC QN AUTO: 27.8 PG (ref 26.6–33)
MCHC RBC AUTO-ENTMCNC: 32.9 G/DL (ref 31.5–35.7)
MCV RBC AUTO: 84.4 FL (ref 79–97)
MONOCYTES # BLD AUTO: 0.8 10*3/MM3 (ref 0.1–0.9)
MONOCYTES NFR BLD AUTO: 7.4 % (ref 5–12)
NEUTROPHILS NFR BLD AUTO: 8.9 10*3/MM3 (ref 1.7–7)
NEUTROPHILS NFR BLD AUTO: 81.2 % (ref 42.7–76)
NRBC BLD AUTO-RTO: 0.2 /100 WBC (ref 0–0.2)
PLATELET # BLD AUTO: 232 10*3/MM3 (ref 140–450)
PMV BLD AUTO: 9.3 FL (ref 6–12)
RBC # BLD AUTO: 2.95 10*6/MM3 (ref 3.77–5.28)
WBC NRBC COR # BLD: 10.9 10*3/MM3 (ref 3.4–10.8)

## 2023-06-02 PROCEDURE — 0 MAGNESIUM SULFATE 20 GM/500ML SOLUTION: Performed by: OBSTETRICS & GYNECOLOGY

## 2023-06-02 PROCEDURE — 85025 COMPLETE CBC W/AUTO DIFF WBC: CPT | Performed by: OBSTETRICS & GYNECOLOGY

## 2023-06-02 PROCEDURE — 25010000002 KETOROLAC TROMETHAMINE PER 15 MG: Performed by: OBSTETRICS & GYNECOLOGY

## 2023-06-02 PROCEDURE — 97161 PT EVAL LOW COMPLEX 20 MIN: CPT | Performed by: PHYSICAL THERAPIST

## 2023-06-02 RX ORDER — FERROUS SULFATE TAB EC 324 MG (65 MG FE EQUIVALENT) 324 (65 FE) MG
324 TABLET DELAYED RESPONSE ORAL 2 TIMES DAILY WITH MEALS
Status: DISCONTINUED | OUTPATIENT
Start: 2023-06-02 | End: 2023-06-03 | Stop reason: HOSPADM

## 2023-06-02 RX ADMIN — LABETALOL HYDROCHLORIDE 200 MG: 200 TABLET, FILM COATED ORAL at 14:40

## 2023-06-02 RX ADMIN — FERROUS SULFATE TAB EC 324 MG (65 MG FE EQUIVALENT) 324 MG: 324 (65 FE) TABLET DELAYED RESPONSE at 18:32

## 2023-06-02 RX ADMIN — KETOROLAC TROMETHAMINE 15 MG: 30 INJECTION, SOLUTION INTRAMUSCULAR; INTRAVENOUS at 08:32

## 2023-06-02 RX ADMIN — LABETALOL HYDROCHLORIDE 200 MG: 200 TABLET, FILM COATED ORAL at 06:05

## 2023-06-02 RX ADMIN — IBUPROFEN 600 MG: 600 TABLET, FILM COATED ORAL at 14:40

## 2023-06-02 RX ADMIN — IBUPROFEN 600 MG: 600 TABLET, FILM COATED ORAL at 20:23

## 2023-06-02 RX ADMIN — LABETALOL HYDROCHLORIDE 200 MG: 200 TABLET, FILM COATED ORAL at 22:19

## 2023-06-02 RX ADMIN — MAGNESIUM SULFATE HEPTAHYDRATE 2 G/HR: 40 INJECTION, SOLUTION INTRAVENOUS at 05:18

## 2023-06-02 RX ADMIN — KETOROLAC TROMETHAMINE 15 MG: 30 INJECTION, SOLUTION INTRAMUSCULAR; INTRAVENOUS at 02:46

## 2023-06-02 RX ADMIN — ACETAMINOPHEN 650 MG: 325 TABLET, FILM COATED ORAL at 18:32

## 2023-06-02 RX ADMIN — ACETAMINOPHEN 1000 MG: 500 TABLET, FILM COATED ORAL at 04:53

## 2023-06-02 RX ADMIN — ACETAMINOPHEN 650 MG: 325 TABLET, FILM COATED ORAL at 22:19

## 2023-06-02 RX ADMIN — DESVENLAFAXINE SUCCINATE 100 MG: 50 TABLET, EXTENDED RELEASE ORAL at 08:32

## 2023-06-02 RX ADMIN — FERROUS SULFATE TAB EC 324 MG (65 MG FE EQUIVALENT) 324 MG: 324 (65 FE) TABLET DELAYED RESPONSE at 10:48

## 2023-06-02 RX ADMIN — ACETAMINOPHEN 1000 MG: 500 TABLET, FILM COATED ORAL at 09:25

## 2023-06-02 NOTE — LACTATION NOTE
This note was copied from a baby's chart.  Assisted mother with waking baby for a feeding. Placed the baby in the crib and he started to stir. Assisted mother into football hold, instructed on gentle manual breast massage. Colostrum very easily expressed.brought baby to breast, he just compressed down, not sucking. Several attempts made with the same results. Tried to get him to suck on the pacifier, same results. With a gloved finger stroked his upper palate. Baby latched and fed for 30 minutes with audible swallowing. Instructed on gentle stimulation to keep baby sucking. Mother did report increase in uterine cramping as feeding progressed. Provided with needed pump supplies. Encouraged to call as needed.

## 2023-06-02 NOTE — THERAPY EVALUATION
Patient Name: Caterina Palacios  : 1990    MRN: [id number]                              Today's Date: 2023       Admit Date: 2023    Visit Dx: No diagnosis found.  Patient Active Problem List   Diagnosis   • Nontoxic multinodular goiter   • Chronic fatigue   • Abnormal weight gain   • Currently pregnant   • Pregnant and not yet delivered   • Severe preeclampsia, third trimester   •  delivery delivered     Past Medical History:   Diagnosis Date   • Exercise-induced asthma    • Kidney stones     kidney stones     Past Surgical History:   Procedure Laterality Date   •  SECTION N/A 2023    Procedure:  SECTION PRIMARY;  Surgeon: Shana Rockwell MD;  Location: [facility] LABOR DELIVERY;  Service: Obstetrics;  Laterality: N/A;   • US GUIDED FINE NEEDLE ASPIRATION  2021      General Information     Row Name 23 1249          Physical Therapy Time and Intention    Document Type evaluation  -EJ     Mode of Treatment physical therapy  -EJ     Row Name 23 1249          General Information    Patient Profile Reviewed yes  -EJ     Prior Level of Function independent:  -EJ     Existing Precautions/Restrictions no known precautions/restrictions  -EJ     Barriers to Rehab none identified  -EJ     Row Name 23 1249          Living Environment    People in Home spouse  -EJ     Name(s) of People in Home Tae ()  -EJ     Row Name 23 1249          Cognition    Orientation Status (Cognition) oriented x 4  -EJ           User Key  (r) = Recorded By, (t) = Taken By, (c) = Cosigned By    Initials Name Provider Type    EJ Jannie Best, PT Physical Therapist               Mobility     Row Name 23 1249          Bed Mobility    Bed Mobility bed mobility (all) activities  -EJ     All Activities, [place] (Bed Mobility) independent  -EJ     Row Name 23 1249          Sit-Stand Transfer    Sit-Stand [place] (Transfers) independent  -EJ     Row Name  23 1249          Gait/Stairs (Locomotion)    [place] Level (Gait) independent  -EJ     Distance in Feet (Gait) Pt up ad henrietta in her room as tolerated.  -EJ           User Key  (r) = Recorded By, (t) = Taken By, (c) = Cosigned By    Initials Name Provider Type    Jannie Pandya, PT Physical Therapist               Obj/Interventions     Row Name 23 1250          Range of Motion Comprehensive    General Range of Motion no range of motion deficits identified  -     Row Name 23 1250          Strength Comprehensive (MMT)    Comment, General Manual Muscle Testing (MMT) Assessment Pt strength NT this date due to recent delivery.  Pt will benefit from pelvic floor/core strengthening once able.  -EJ           User Key  (r) = Recorded By, (t) = Taken By, (c) = Cosigned By    Initials Name Provider Type    Jannie Pandya, PT Physical Therapist               Goals/Plan    No documentation.                Clinical Impression     Row Name 23 1252          Pain    Additional Documentation Pain Scale: FACES Pre/Post-Treatment (Group)  -     Row Name 23 1252          Pain Scale: FACES Pre/Post-Treatment    Pain: FACES Scale, Pretreatment 2-->hurts little bit  -EJ     Posttreatment Pain Rating 2-->hurts little bit  -EJ     Pain Location incisional  -EJ     Pain Location - abdomen  -EJ     Row Name 23 1252          Plan of Care Review    Plan of Care Reviewed With patient  -     Outcome Evaluation Patient is a 31 y/o F,, who came to [facility] 36w6d gestation.  She had a  birth 23.  During today's evaluation PT provided pt with handout regarding postpartum healing post  birth.  She demonstrated good understanding of material after education.  Provided pt with handout to keep, and contact information is present if pt has any additional PT needs/questions while in the hospital or post discharge.  -     Row Name 23 1252          Therapy Assessment/Plan (PT)     Criteria for Skilled Interventions Met (PT) no  -EJ     Therapy Frequency (PT) evaluation only  -EJ     Predicted Duration of Therapy Intervention (PT) discharge  -EJ           User Key  (r) = Recorded By, (t) = Taken By, (c) = Cosigned By    Initials Name Provider Type    Jannie Pandya, PT Physical Therapist               Outcome Measures     Row Name 06/02/23 1253          How much help from another person do you currently need...    Turning from your back to your side while in flat bed without using bedrails? 4  -EJ     Moving from lying on back to sitting on the side of a flat bed without bedrails? 4  -EJ     Moving to and from a bed to a chair (including a wheelchair)? 4  -EJ     Standing up from a chair using your arms (e.g., wheelchair, bedside chair)? 4  -EJ     Climbing 3-5 steps with a railing? 4  -EJ     To walk in hospital room? 4  -EJ     AM-PAC 6 Clicks Score (PT) 24  -EJ     Highest level of mobility 8 --> Walked 250 feet or more  -     Row Name 06/02/23 1253          Functional Assessment    Outcome Measure Options AM-PAC 6 Clicks Basic Mobility (PT)  -EJ           User Key  (r) = Recorded By, (t) = Taken By, (c) = Cosigned By    Initials Name Provider Type    Jannie Pandya, PT Physical Therapist                             Physical Therapy Education     Title: PT OT SLP Therapies (Done)     Topic: Physical Therapy (Done)     Point: Home exercise program (Done)     Learning Progress Summary           Patient Acceptance, E,H, VU by  at 6/2/2023 1254                   Point: Body mechanics (Done)     Learning Progress Summary           Patient Acceptance, E,H, VU by  at 6/2/2023 1254                   Point: Precautions (Done)     Learning Progress Summary           Patient Acceptance, E,H, VU by  at 6/2/2023 1254                               User Key     Initials Effective Dates Name Provider Type [facility] 05/31/23 -  Jannie Best, PT Physical Therapist PT              PT  Recommendation and Plan     Plan of Care Reviewed With: patient  Outcome Evaluation: Patient is a 31 y/o F,, who came to [facility] 36w6d gestation.  She had a  birth 23.  During today's evaluation PT provided pt with handout regarding postpartum healing post  birth.  She demonstrated good understanding of material after education.  Provided pt with handout to keep, and contact information is present if pt has any additional PT needs/questions while in the hospital or post discharge.      Patient education provided on:   -Body changes after pregnancy  -Pelvic floor musculature, Transversus abdominus muscle  -Diaphragmatic breathing  -Proper kegal performance, as well as importance of relaxation   -EVERARDO  -Body mechanics   -Proper breathing/pressure management   -Toileting posture   -Benefits of exercise  -Basic/beginning exercise 0-2 weeks, 2-6 weeks, and after 6 weeks  -Postpartum safe stretches   -UI  -  Scar work   -When it may be beneficial to see a Pelvic PT     Time Calculation:    PT Charges     Row Name 23 1254             Time Calculation    Start Time 1148  -EJ      Stop Time 1156  -EJ      Time Calculation (min) 8 min  -EJ      PT Received On 23  -EJ         Time Calculation- PT    Total Timed Code Minutes- PT 0 minute(s)  -EJ            User Key  (r) = Recorded By, (t) = Taken By, (c) = Cosigned By    Initials Name Provider Type     Jannie Best, PT Physical Therapist              Therapy Charges for Today     Code Description Service Date Service Provider Modifiers Qty    11981697960 HC PT EVAL LOW COMPLEXITY 3 2023 Jannie Best, PT GP 1          PT G-Codes  Outcome Measure Options: AM-PAC 6 Clicks Basic Mobility (PT)  AM-PAC 6 Clicks Score (PT): 24  PT Discharge Summary  Anticipated Discharge Disposition (PT): home    Jannie Best, PT  2023

## 2023-06-02 NOTE — PROGRESS NOTES
SHARIFA Conway  Postpartum Note    Subjective   Postpartum Day 1:  Primary Low Transverse  Section    Patient without complaints. Her pain is well controlled with nonsteroidal anti-inflammatory drugs and prescribed pain medications. She is ambulating well.  Patient describes her bleeding as thin lochia. MagSO4 d/c'd this am. [place] cath d/c'd as well. Pt has not been up yet to void. Pt denies headache, visual changes, RUQ pain, or dizziness. Pt taking Labetalol 200mg tid. Passing flatus.     Breastfeeding: infant latching with difficulty.    Objective     Vitals:  Vitals:    23 0730 23 0800 23 0830 23 0950   BP: 123/68 155/87 139/86 139/83   BP Location:   Right arm    Patient Position:   Sitting    Pulse: 85 97 86 80   Resp:   18    Temp:   98.5 °F (36.9 °C)    TempSrc:   Oral    SpO2:    100%   Weight:       Height:           Physical Exam:  General:  Alert and oriented x3. No acute distress.  Abdomen: abdomen is soft without significant tenderness, masses, organomegaly or guarding. Fundus: appropriate, firm, non tender  Incision: Clean/Dry/Intact and Bandage in Place  Skin: Warm, Dry  Extremities: Normal,  trace edema. Nontender     Labs:  Results from last 7 days   Lab Units 23  0615 23  1010   WBC 10*3/mm3 10.90* 7.30   HEMOGLOBIN g/dL 8.2* 10.7*   HEMATOCRIT % 24.9* 33.1*   PLATELETS 10*3/mm3 232 260     Results from last 7 days   Lab Units 23  1010   GLUCOSE mg/dL 99        Feeding method: Breastfeeding Status: Unknown     Blood Type: RH Positive        Assessment & Plan     Principal Problem:    Pregnant and not yet delivered  Active Problems:    Severe preeclampsia, third trimester     delivery delivered  Received MagSO4  Anemia - Hgb 10.7 to 8.2, asymptomatic    Caterina Palacios is Day 1  post-partum from a  Primary Low Transverse  Section      Plan:  routine, continue present management, encourage ambulation, monitor BPs and monitor pain. Labetolol  200mg tid. FeSO4 BID.        Yessenia Pineda, APRN  6/2/2023  10:08 EDT

## 2023-06-02 NOTE — PLAN OF CARE
Goal Outcome Evaluation: Pt did well overnight, got little sleep d/t infant feeding times but did rest for short periods. Pain well controlled with scheduled tylenol and motrin, didn't require PRN pain medication overnight. Output has been appropriate. Pt ambulated twice overnight and did well, reported that it felt good to move though she was more fatigued afterward. No other concerns at this time.

## 2023-06-02 NOTE — PLAN OF CARE
Goal Outcome Evaluation:  Plan of Care Reviewed With: patient           Outcome Evaluation: Patient is a 33 y/o F,, who came to [facility] 36w6d gestation.  She had a  birth 23.  During today's evaluation PT provided pt with handout regarding postpartum healing post  birth.  She demonstrated good understanding of material after education.  Provided pt with handout to keep, and contact information is present if pt has any additional PT needs/questions while in the hospital or post discharge.        Patient education provided on:   -Body changes after pregnancy  -Pelvic floor musculature, Transversus abdominus muscle  -Diaphragmatic breathing  -Proper kegal performance, as well as importance of relaxation   -EVERARDO  -Body mechanics   -Proper breathing/pressure management   -Toileting posture   -Benefits of exercise  -Basic/beginning exercise 0-2 weeks, 2-6 weeks, and after 6 weeks  -Postpartum safe stretches   -UI  -  Scar work   -When it may be beneficial to see a Pelvic PT

## 2023-06-03 VITALS
HEIGHT: 68 IN | OXYGEN SATURATION: 98 % | SYSTOLIC BLOOD PRESSURE: 136 MMHG | WEIGHT: 241.84 LBS | HEART RATE: 98 BPM | BODY MASS INDEX: 36.65 KG/M2 | DIASTOLIC BLOOD PRESSURE: 84 MMHG | RESPIRATION RATE: 17 BRPM | TEMPERATURE: 98 F

## 2023-06-03 RX ORDER — LABETALOL 200 MG/1
200 TABLET, FILM COATED ORAL EVERY 8 HOURS SCHEDULED
Qty: 90 TABLET | Refills: 3 | Status: SHIPPED | OUTPATIENT
Start: 2023-06-03

## 2023-06-03 RX ORDER — IBUPROFEN 600 MG/1
600 TABLET ORAL EVERY 6 HOURS
Qty: 30 TABLET | Refills: 0 | Status: SHIPPED | OUTPATIENT
Start: 2023-06-03

## 2023-06-03 RX ADMIN — IBUPROFEN 600 MG: 600 TABLET, FILM COATED ORAL at 16:13

## 2023-06-03 RX ADMIN — IBUPROFEN 600 MG: 600 TABLET, FILM COATED ORAL at 08:49

## 2023-06-03 RX ADMIN — LABETALOL HYDROCHLORIDE 200 MG: 200 TABLET, FILM COATED ORAL at 06:57

## 2023-06-03 RX ADMIN — FERROUS SULFATE TAB EC 324 MG (65 MG FE EQUIVALENT) 324 MG: 324 (65 FE) TABLET DELAYED RESPONSE at 08:49

## 2023-06-03 RX ADMIN — IBUPROFEN 600 MG: 600 TABLET, FILM COATED ORAL at 02:07

## 2023-06-03 RX ADMIN — LABETALOL HYDROCHLORIDE 200 MG: 200 TABLET, FILM COATED ORAL at 13:24

## 2023-06-03 RX ADMIN — ACETAMINOPHEN 650 MG: 325 TABLET, FILM COATED ORAL at 04:50

## 2023-06-03 NOTE — PLAN OF CARE
Goal Outcome Evaluation:  Plan of Care Reviewed With: patient        Progress: improving            Problem: Adult Inpatient Plan of Care  Goal: Plan of Care Review  Outcome: Ongoing, Progressing  Flowsheets (Taken 2023)  Progress: improving  Plan of Care Reviewed With: patient     Problem: Maternal-Fetal Wellbeing  Goal: Optimal Maternal-Fetal Wellbeing  Outcome: Ongoing, Progressing     Problem: Breastfeeding  Goal: Effective Breastfeeding  Outcome: Ongoing, Progressing     Problem: Adjustment to Role Transition (Postpartum  Delivery)  Goal: Successful Maternal Role Transition  Outcome: Ongoing, Progressing     Problem: Pain (Postpartum  Delivery)  Goal: Acceptable Pain Control  Outcome: Ongoing, Progressing  Intervention: Prevent or Manage Pain  Recent Flowsheet Documentation  Taken 2023 1440 by Fatimah Heart, RN  Pain Management Interventions:   quiet environment facilitated   see MAR   position adjusted   pillow support provided   care clustered   breathing exercises

## 2023-06-03 NOTE — LACTATION NOTE
This note was copied from a baby's chart.  Mother reports feedings are going well. Starting to feel some breast changes. Nipples slightly tender. Reinforced nipple care and nipple care products. Plans for discharge today. Discussed first night at home. Provided with  discharge weight ticket and lactation contact card. Encouraged to call as needed.

## 2023-06-03 NOTE — PLAN OF CARE
Goal Outcome Evaluation:  Plan of Care Reviewed With: patient, spouse           Outcome Evaluation: Pt d/rafita to home. Pt tearful and anxious intermittently today. Reassurance given several times. Breastfeeding information given and explained. Pt pumping and latching intermittent today. Pt unable to sleep today but reassured nursing she would feel better once home. Discssed postpartum depression vs baby blues, importance of resources available with helping at home. Pt up and about today, pain managed with ibuprofen and tylenol, requiring no narcotics for discharge. Care of incision and s/s infection discussed as well. Pt left per w/c to home with  and .

## 2023-06-03 NOTE — DISCHARGE SUMMARY
[facility]  Delivery Discharge Summary    Primary OB Clinician: Nika Diane MD    Admission Diagnosis:  Principal Problem:    Severe preeclampsia, third trimester     delivery delivered      Discharge Diagnosis:  S/p primary CS  Severe preeclampsia    Gestational Age: 36w6d    Date of Delivery: 2023     Delivered By:  Shana Rockwell     Delivery Type: , Low Transverse      Intrapartum Course: Uncomplicated delivery.     Postpartum Course:  Patient's postpartum course has been uncomplicated.  Her bleeding is minimal, and her pain is controlled.  She is ambulating and tolerating regular diet.  She is breast-feeding.  She is being discharged home today.  She will follow-up for her postpartum visit and early next week for a BP check.      Physical Exam:    Vitals:   Vitals:    23 2339 23 0344 23 0659 23 0737   BP: 117/73 123/77 160/98 130/76   BP Location: Left arm Left arm Right arm Right arm   Patient Position: Lying Lying Sitting Sitting   Pulse: 99 95  89   Resp: 18 18  17   Temp: 97.8 °F (36.6 °C) 98.1 °F (36.7 °C)  98.2 °F (36.8 °C)   TempSrc: Oral Oral  Oral   SpO2: 98% 98%  98%   Weight:       Height:         Temp (24hrs), Av.3 °F (36.8 °C), Min:97.8 °F (36.6 °C), Max:98.9 °F (37.2 °C)      General Appearance:    Alert, cooperative, in no acute distress   Abdomen:     Soft non-tender, non-distended, no guarding, no rebound         tenderness.   Extremities:   Moves all extremities well, no edema, no cyanosis, no             redness.   Incision:   C/D/I   Fundus:   Firm, below umbilicus     Feeding method: Breastfeeding Status: Yes    Blood Type: RH Positive      Plan:  Discharge to home.    Follow-up appointment in 3 days.

## 2023-06-03 NOTE — PLAN OF CARE
Goal Outcome Evaluation:  Plan of Care Reviewed With: patient        Progress: improving  Outcome Evaluation: Patient is breastfeeding and bonding well with baby.  Mother has been anxious about infant getting enough breastmilk so she has pumped so she can visually see what the baby is consuming.  Patient is voiding without difficulty and having good output.

## 2023-06-05 NOTE — PROGRESS NOTES
Case Management Discharge Note           Provided Post Acute Provider List?: N/A  Provided Post Acute Provider Quality & Resource List?: N/A    Selected Continued Care - Discharged on 6/3/2023 Admission date: 5/29/2023 - Discharge disposition: Home or Self Care      Destination    No services have been selected for the patient.                Durable Medical Equipment    No services have been selected for the patient.                Dialysis/Infusion    No services have been selected for the patient.                Home Medical Care    No services have been selected for the patient.                Therapy    No services have been selected for the patient.                Community Resources    No services have been selected for the patient.                Community & DME    No services have been selected for the patient.                         Final Discharge Disposition Code: 01 - home or self-care

## 2023-07-11 LAB — QT INTERVAL: 353 MS

## 2024-04-02 ENCOUNTER — OFFICE VISIT (OUTPATIENT)
Dept: ENDOCRINOLOGY | Age: 34
End: 2024-04-02
Payer: COMMERCIAL

## 2024-04-02 VITALS
HEIGHT: 68 IN | WEIGHT: 231.8 LBS | OXYGEN SATURATION: 99 % | DIASTOLIC BLOOD PRESSURE: 84 MMHG | BODY MASS INDEX: 35.13 KG/M2 | SYSTOLIC BLOOD PRESSURE: 142 MMHG | HEART RATE: 94 BPM

## 2024-04-02 DIAGNOSIS — E04.2 MULTINODULAR GOITER: Primary | ICD-10-CM

## 2024-04-02 PROCEDURE — 99214 OFFICE O/P EST MOD 30 MIN: CPT | Performed by: STUDENT IN AN ORGANIZED HEALTH CARE EDUCATION/TRAINING PROGRAM

## 2024-04-02 NOTE — ASSESSMENT & PLAN NOTE
No radiation exposure  Has family history of thyroid cancer   FNA in 2021 was benign  Repeat thyroid us stable in 6/2022   will repeat thyroid ultrasound and TFT

## 2024-04-02 NOTE — PROGRESS NOTES
"Chief Complaint  Nontoxic multinodular goiter    Subjective        Caterina Palacios presents to [facility] ENDOCRINOLOGY  for follow up.     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  Has family history of Hashimoto's thyroiditis and Graves' disease  Her mom had thyroid cancer s/p thyroidectomy and GARRISON  FNA in 2021 was benign    Has a 9-month-old baby  Currently breast-feeding  Did not require to take thyroid medications during her pregnancy        Component      Latest Ref Rng 6/9/2022   Glucose      65 - 99 mg/dL 79    BUN      6 - 20 mg/dL 11    Creatinine      0.57 - 1.00 mg/dL 0.59    EGFR Result      >59 mL/min/1.73 125    BUN/Creatinine Ratio      9 - 23  19    Sodium      134 - 144 mmol/L 138    Potassium      3.5 - 5.2 mmol/L 4.5    Chloride      96 - 106 mmol/L 100    CO2      20 - 29 mmol/L 21    Calcium      8.7 - 10.2 mg/dL 10.4 (H)    Vitamin B-12      232 - 1,245 pg/mL 966    Folate      >3.0 ng/mL 13.1    25 Hydroxy, Vitamin D      30.0 - 100.0 ng/mL 31.2    Free T4      0.82 - 1.77 ng/dL 1.34    TSH Baseline      0.450 - 4.500 uIU/mL 0.645    T3, Free      2.0 - 4.4 pg/mL 4.6 (H)       Legend:  (H) High  Objective   Vital Signs:  /84 (BP Location: Left arm, Patient Position: Sitting)   Pulse 94   Ht 172.7 cm (67.99\")   Wt 105 kg (231 lb 12.8 oz)   SpO2 99%   BMI 35.25 kg/m²   Estimated body mass index is 35.25 kg/m² as calculated from the following:    Height as of this encounter: 172.7 cm (67.99\").    Weight as of this encounter: 105 kg (231 lb 12.8 oz).             Review of Systems   Eyes:  Negative for visual disturbance.   Cardiovascular:  Negative for palpitations.   Neurological:  Negative for dizziness and light-headedness.        Physical Exam  Constitutional:       Appearance: She is obese.   HENT:      Head: Normocephalic and atraumatic. " "  Eyes:      Extraocular Movements: Extraocular movements intact.   Cardiovascular:      Rate and Rhythm: Normal rate and regular rhythm.   Pulmonary:      Effort: Pulmonary effort is normal.      Breath sounds: Normal breath sounds. No wheezing.   Abdominal:      Palpations: Abdomen is soft.      Tenderness: There is no abdominal tenderness.   Musculoskeletal:         General: No swelling. Normal range of motion.      Cervical back: Neck supple. No tenderness.   Neurological:      Mental Status: She is alert and oriented to person, place, and time.   Psychiatric:         Mood and Affect: Mood normal.        Result Review :  The following data was reviewed by: Mahrokh Nokhbehzaeim, MD on 04/02/2024:  CMP          5/29/2023    10:10   CMP   Glucose 99    BUN 5    Creatinine 0.52    EGFR 128.4    Sodium 136    Potassium 4.0    Chloride 103    Calcium 9.1    Total Protein 6.3    Albumin 3.3    Globulin 3.0    Total Bilirubin <0.2    Alkaline Phosphatase 158    AST (SGOT) 14    ALT (SGPT) 8    Albumin/Globulin Ratio 1.1    BUN/Creatinine Ratio 9.6    Anion Gap 13.0               Free T4   Date Value Ref Range Status   06/09/2022 1.34 0.82 - 1.77 ng/dL Final      T3, Free   Date Value Ref Range Status   06/09/2022 4.6 (H) 2.0 - 4.4 pg/mL Final      No results found for: \"THGAB\"   Thyroid Peroxidase Antibody   Date Value Ref Range Status   10/23/2020 <9 0 - 34 IU/mL Final      Data reviewed : Radiologic studies thyroid ultrasound             Assessment and Plan   Diagnoses and all orders for this visit:    1. Multinodular goiter (Primary)  Assessment & Plan:  No radiation exposure  Has family history of thyroid cancer   FNA in 2021 was benign  Repeat thyroid us stable in 6/2022   will repeat thyroid ultrasound and TFT          Orders:  -     TSH  -     T4, Free  -     US Thyroid; Future             Follow Up   Return in about 1 year (around 4/2/2025).  Patient was given instructions and counseling regarding her condition " or for health maintenance advice. Please see specific information pulled into the AVS if appropriate.

## 2024-04-03 LAB
T4 FREE SERPL-MCNC: 1.25 NG/DL (ref 0.93–1.7)
TSH SERPL DL<=0.005 MIU/L-ACNC: 0.4 UIU/ML (ref 0.27–4.2)

## 2024-06-05 ENCOUNTER — HOSPITAL ENCOUNTER (OUTPATIENT)
Dept: ULTRASOUND IMAGING | Facility: HOSPITAL | Age: 34
Discharge: HOME | End: 2024-06-05
Payer: COMMERCIAL

## 2024-06-05 DIAGNOSIS — E04.2 MULTINODULAR GOITER: ICD-10-CM

## 2024-06-05 PROCEDURE — 76536 US EXAM OF HEAD AND NECK: CPT

## (undated) DEVICE — SUT VIC 0 CT1 36IN J946H

## (undated) DEVICE — DRSNG WND BORDR/ADHS NONADHR/GZ LF 4X10IN STRL

## (undated) DEVICE — SOL IRRIG H2O 1000ML STRL

## (undated) DEVICE — SPNG LAP PREWSH SFTPK 18X18IN STRL PK/5

## (undated) DEVICE — PK C SECT 50

## (undated) DEVICE — SUT MNCRYL 3/0 CT1 36 IN Y944H

## (undated) DEVICE — SOL IRRIG NACL 9PCT 1000ML BTL

## (undated) DEVICE — SUT MNCRYL 0 CT 36IN

## (undated) DEVICE — TRY SADDLE BLCK 25G

## (undated) DEVICE — GLV SURG SENSICARE PI MIC PF SZ7 LF STRL

## (undated) DEVICE — SUT MNCRYL 0/0 CTX 36IN Y398H